# Patient Record
Sex: FEMALE | Race: WHITE | Employment: UNEMPLOYED | URBAN - NONMETROPOLITAN AREA
[De-identification: names, ages, dates, MRNs, and addresses within clinical notes are randomized per-mention and may not be internally consistent; named-entity substitution may affect disease eponyms.]

---

## 2020-10-21 ENCOUNTER — INITIAL CONSULT (OUTPATIENT)
Dept: PULMONOLOGY | Age: 25
End: 2020-10-21
Payer: COMMERCIAL

## 2020-10-21 VITALS
HEART RATE: 114 BPM | WEIGHT: 293 LBS | HEIGHT: 68 IN | OXYGEN SATURATION: 98 % | SYSTOLIC BLOOD PRESSURE: 126 MMHG | BODY MASS INDEX: 44.41 KG/M2 | DIASTOLIC BLOOD PRESSURE: 76 MMHG

## 2020-10-21 PROCEDURE — 99204 OFFICE O/P NEW MOD 45 MIN: CPT | Performed by: NURSE PRACTITIONER

## 2020-10-21 PROCEDURE — 1036F TOBACCO NON-USER: CPT | Performed by: NURSE PRACTITIONER

## 2020-10-21 PROCEDURE — G8427 DOCREV CUR MEDS BY ELIG CLIN: HCPCS | Performed by: NURSE PRACTITIONER

## 2020-10-21 PROCEDURE — G8484 FLU IMMUNIZE NO ADMIN: HCPCS | Performed by: NURSE PRACTITIONER

## 2020-10-21 PROCEDURE — G8419 CALC BMI OUT NRM PARAM NOF/U: HCPCS | Performed by: NURSE PRACTITIONER

## 2020-10-21 RX ORDER — FOLIC ACID 1 MG/1
1 TABLET ORAL DAILY
COMMUNITY
End: 2022-02-18 | Stop reason: ALTCHOICE

## 2020-10-21 RX ORDER — FERROUS SULFATE 325(65) MG
325 TABLET ORAL
COMMUNITY

## 2020-10-21 RX ORDER — SODIUM OXYBATE 0.5 G/ML
3.75 SOLUTION ORAL 2 TIMES DAILY
Qty: 450 ML | Refills: 0 | Status: SHIPPED | OUTPATIENT
Start: 2020-10-21 | End: 2021-09-28 | Stop reason: SDUPTHER

## 2020-10-21 RX ORDER — FLUOXETINE 10 MG/1
10 CAPSULE ORAL DAILY
COMMUNITY
End: 2020-10-21 | Stop reason: SDUPTHER

## 2020-10-21 RX ORDER — SODIUM OXYBATE 0.5 G/ML
3.75 SOLUTION ORAL 2 TIMES DAILY
Qty: 450 ML | Refills: 0 | Status: SHIPPED | OUTPATIENT
Start: 2020-10-21 | End: 2020-10-21 | Stop reason: DRUGHIGH

## 2020-10-21 RX ORDER — DEXTROAMPHETAMINE SACCHARATE, AMPHETAMINE ASPARTATE MONOHYDRATE, DEXTROAMPHETAMINE SULFATE AND AMPHETAMINE SULFATE 2.5; 2.5; 2.5; 2.5 MG/1; MG/1; MG/1; MG/1
10 CAPSULE, EXTENDED RELEASE ORAL EVERY MORNING
COMMUNITY
End: 2020-10-21

## 2020-10-21 RX ORDER — DEXTROAMPHETAMINE SACCHARATE, AMPHETAMINE ASPARTATE MONOHYDRATE, DEXTROAMPHETAMINE SULFATE AND AMPHETAMINE SULFATE 2.5; 2.5; 2.5; 2.5 MG/1; MG/1; MG/1; MG/1
10 CAPSULE, EXTENDED RELEASE ORAL EVERY MORNING
COMMUNITY
End: 2020-10-21 | Stop reason: SDUPTHER

## 2020-10-21 RX ORDER — DEXTROAMPHETAMINE SACCHARATE, AMPHETAMINE ASPARTATE, DEXTROAMPHETAMINE SULFATE AND AMPHETAMINE SULFATE 2.5; 2.5; 2.5; 2.5 MG/1; MG/1; MG/1; MG/1
10 TABLET ORAL
Qty: 30 TABLET | Refills: 0 | Status: SHIPPED | OUTPATIENT
Start: 2020-10-21 | End: 2020-11-30 | Stop reason: SDUPTHER

## 2020-10-21 RX ORDER — DEXTROAMPHETAMINE SACCHARATE, AMPHETAMINE ASPARTATE MONOHYDRATE, DEXTROAMPHETAMINE SULFATE AND AMPHETAMINE SULFATE 2.5; 2.5; 2.5; 2.5 MG/1; MG/1; MG/1; MG/1
10 CAPSULE, EXTENDED RELEASE ORAL EVERY MORNING
Qty: 30 CAPSULE | Refills: 0 | Status: SHIPPED | OUTPATIENT
Start: 2020-10-21 | End: 2020-11-30 | Stop reason: SDUPTHER

## 2020-10-21 RX ORDER — FLUOXETINE 10 MG/1
10 CAPSULE ORAL DAILY
Qty: 90 CAPSULE | Refills: 3 | Status: SHIPPED | OUTPATIENT
Start: 2020-10-21 | End: 2021-02-01 | Stop reason: SDUPTHER

## 2020-10-21 ASSESSMENT — ENCOUNTER SYMPTOMS
EYES NEGATIVE: 1
CHEST TIGHTNESS: 0
ABDOMINAL PAIN: 0
COUGH: 0
WHEEZING: 0
VOMITING: 0
ALLERGIC/IMMUNOLOGIC NEGATIVE: 1
SHORTNESS OF BREATH: 0
NAUSEA: 0
DIARRHEA: 0

## 2020-10-21 NOTE — PROGRESS NOTES
[facility] [place] [facility]    Patient: Francisca Weathers, 30 y.o.   : 1995  10/21/2020      New sleep patient      Subjective     Chief Complaint   Patient presents with    New Patient     Sleep consult, self referred. Last PSG 19 no machine        HPI  Adelfo Gibbons is here as self referral for FREDY/ narcolepsy, wants to switch providers due to location. Currently follows in [facility] with Dr Gina Cummings MD however patient lives in [facility] [place] and wants to f/u closer to home. Accompanied by her mother today   Symptoms include tossing and turning, excessive daytime sleepiness, awakening in the middle of the night because of unclear reason, does have trouble falling asleep once awake. Adelfo Gibbons has seen significant improvement in her cataplexy symptoms with current therapy. Started with Adderall in , the next year she was at [facility], was placed on Xzyrem at the time, and Prozac and Adderall were decreased. She has been on this therapy now for years and feels symptoms controlled. Here original sleep study was done at [facility] in - pr notes sleep efficacy was 78% and no evidence of PLMD or FREDY. MSLT 2010 demonstrated severe degress of daytime sleepiness , mean sleep latency 0.4 min, sleep onset REM periods: 4. She was then diagnosed with narcolepsy type I. During college pt had significant weight gain and per patient \" something with the opening in my throat the doctor was concernred aboout, so underwent sleep study \"   HST done in 2012 now revealed FREDY, AHI 7.9. She had a dental appliance custom made, but could never tolerate wearing. She was waking up at night spitting it out and developed dental carries. She has not been wearing the device now for about 3 months  Does have daytime sleepiness with ep worth of 14. Sleep Hx   SLEEP HISTORY    Sleep Symptoms  This has been evaluated or treated before.       Bedtime Narative  She goes to bed at 1030 pm  and wakes up at 4 am but goes back to sleep on cough until 730 am  Porsha Huang reports that this is  different on the weekends. Most of the time, ittakes her less than 30 min to fall asleep with difficulty falling back to sleep if she awakens, usually because she has to go to the bathroom. Nap History  Porsha Huang does take naps very often. If she does, they are helpful. Snoring History  Amandadoes not snore or has not been told she snores. There have not been witnessed apneas. She does not awakenwith choking or gasping. Elida Brooks does not have recurring dreams, and specifically does not have episodes of feeling paralyzed while awake, or anything to suggest cataplexy or dreams she would describe as hallucinations. Driving History  Porsha Huang does not report sleepiness while driving. There have not been driving accidents or near-misses related to sleepiness, fatigue or inattention. Weight Issues  There has not been a change in her weight over the past year, slight weight gain in past 5 years. Caffeine Intake  Porsha Huang does not drink caffeine, usually about  0  per day. Employment History  Porsha Huang works as  and  ,  The work hours are generally [phone] and there has notbeen any recent changes in the shifts or hours. Family Sleep History  There are notfamily members with a history of sleep problems.       Download     PAP Download:   Original or initial  AHI: 8.57     Date of initial study: 6/4/19   Neck Size: 17.5 inch  Mallampati Mallampati 3  ESS:  14   SAQLI: 88    Past Medical hx     PMH:  Past Medical History:   Diagnosis Date    Cataplexy 2010    Low iron     Narcolepsy 2010    FREDY (obstructive sleep apnea)      SURGICAL HISTORY:  Past Surgical History:   Procedure Laterality Date    KIDNEY SURGERY  02/2000     SOCIAL HISTORY:  Social History     Tobacco Use    Smoking status: Never Smoker    Smokeless tobacco: Never Used   Substance Use Topics    Alcohol use: Not on file    Drug She is not in acute distress. Appearance: She is well-developed. She is morbidly obese. HENT:      Mouth/Throat:      Lips: Pink. Mouth: Mucous membranes are moist.      Pharynx: Oropharynx is clear. No oropharyngeal exudate or posterior oropharyngeal erythema. Tonsils: No tonsillar exudate or tonsillar abscesses. Comments: Enlarged bilateral tonsils   mallampatti III, large tongue     Eyes:      Conjunctiva/sclera: Conjunctivae normal.   Neck:      Musculoskeletal: Neck supple. Thyroid: No thyromegaly. Vascular: No JVD. Trachea: Trachea normal.   Cardiovascular:      Rate and Rhythm: Normal rate and regular rhythm. Heart sounds: No murmur. No friction rub. Pulmonary:      Effort: Pulmonary effort is normal. No accessory muscle usage or respiratory distress. Breath sounds: Normal breath sounds. No wheezing, rhonchi or rales. Chest:      Chest wall: No tenderness. Musculoskeletal:      Right lower leg: No edema. Left lower leg: No edema. Lymphadenopathy:      Cervical: No cervical adenopathy. Skin:     General: Skin is warm and dry. Capillary Refill: Capillary refill takes less than 2 seconds. Nails: There is no clubbing. Neurological:      Mental Status: She is alert. Psychiatric:         Mood and Affect: Mood normal.         Behavior: Behavior normal.         Thought Content: Thought content normal.         Judgment: Judgment normal.          Sleep Study             Assessment      Diagnosis Orders   1. Narcolepsy and cataplexy  amphetamine-dextroamphetamine (ADDERALL, 10MG,) 10 MG tablet    amphetamine-dextroamphetamine (ADDERALL XR) 10 MG extended release capsule    sodium oxybate (XYREM) 500 MG/ML SOLN solution   2. FREDY (obstructive sleep apnea)  DME Order for CPAP as OP   3.  Morbid obesity with BMI of 45.0-49.9, adult ([facility] [address]  DME Order for CPAP as OP     Untreated FREDY, failed therapy with dental appliance   Plan   -she has been on therapy for her cataplexy and narcolepsy for several years with benefit and not having any major side effects or adverse effects of the medications: Will continue Xyrem, Adderall and Prozac - all refilled  -for her uncontrolled FREDY, will start AutoCPAP - pt would like to use [facility] in [facility]  - she call my office for earlier appointment if needed for worseningof sleep symptoms. - she was instructed on weight loss  - Tyler Charlton  was educated about my impression and plan. Patient verbalizes understanding. We will see Tyler Charlton  back in 6-8 weeks with download in [facility] clinic     PDMP Monitoring:    Last PDMP Melissa Oliver as Reviewed [facility]):  Review User Review Instant Review Result   [facility] 10/21/2020  9:09 AM Reviewed PDMP [1]       Urine Drug Screenings (1 yr)     No resulted procedures found.         Medication Contract and Consent for Opioid Use Documents Filed      No documents found- pt completed in office today prior to leaving               Electronically signed by SHANTEL Guadalupe CNP on 10/21/2020 at 9:24 AM    10/21/2020

## 2020-11-30 RX ORDER — DEXTROAMPHETAMINE SACCHARATE, AMPHETAMINE ASPARTATE MONOHYDRATE, DEXTROAMPHETAMINE SULFATE AND AMPHETAMINE SULFATE 2.5; 2.5; 2.5; 2.5 MG/1; MG/1; MG/1; MG/1
10 CAPSULE, EXTENDED RELEASE ORAL EVERY MORNING
Qty: 30 CAPSULE | Refills: 0 | Status: SHIPPED | OUTPATIENT
Start: 2020-11-30 | End: 2020-12-18 | Stop reason: SDUPTHER

## 2020-11-30 RX ORDER — DEXTROAMPHETAMINE SACCHARATE, AMPHETAMINE ASPARTATE, DEXTROAMPHETAMINE SULFATE AND AMPHETAMINE SULFATE 2.5; 2.5; 2.5; 2.5 MG/1; MG/1; MG/1; MG/1
10 TABLET ORAL
Qty: 30 TABLET | Refills: 0 | Status: SHIPPED | OUTPATIENT
Start: 2020-11-30 | End: 2020-12-18 | Stop reason: SDUPTHER

## 2020-11-30 NOTE — TELEPHONE ENCOUNTER
Eddy Mckinney called requesting a refill on the following medications:  Requested Prescriptions     Pending Prescriptions Disp Refills    amphetamine-dextroamphetamine (ADDERALL XR) 10 MG extended release capsule 30 capsule 0     Sig: Take 1 capsule by mouth every morning for 30 days.  amphetamine-dextroamphetamine (ADDERALL, 10MG,) 10 MG tablet 30 tablet 0     Sig: Take 1 tablet by mouth Daily with lunch for 30 days.      [facility]      Date of last visit: 10/21/20  Date of next visit (if applicable): Visit date not found

## 2020-12-18 ENCOUNTER — OFFICE VISIT (OUTPATIENT)
Dept: PULMONOLOGY | Age: 25
End: 2020-12-18
Payer: COMMERCIAL

## 2020-12-18 VITALS
OXYGEN SATURATION: 99 % | TEMPERATURE: 97.4 F | HEART RATE: 104 BPM | WEIGHT: 293 LBS | SYSTOLIC BLOOD PRESSURE: 128 MMHG | HEIGHT: 68 IN | BODY MASS INDEX: 44.41 KG/M2 | DIASTOLIC BLOOD PRESSURE: 74 MMHG

## 2020-12-18 PROCEDURE — 99214 OFFICE O/P EST MOD 30 MIN: CPT | Performed by: NURSE PRACTITIONER

## 2020-12-18 PROCEDURE — 1036F TOBACCO NON-USER: CPT | Performed by: NURSE PRACTITIONER

## 2020-12-18 PROCEDURE — G8417 CALC BMI ABV UP PARAM F/U: HCPCS | Performed by: NURSE PRACTITIONER

## 2020-12-18 PROCEDURE — G8484 FLU IMMUNIZE NO ADMIN: HCPCS | Performed by: NURSE PRACTITIONER

## 2020-12-18 PROCEDURE — G8427 DOCREV CUR MEDS BY ELIG CLIN: HCPCS | Performed by: NURSE PRACTITIONER

## 2020-12-18 RX ORDER — DEXTROAMPHETAMINE SACCHARATE, AMPHETAMINE ASPARTATE, DEXTROAMPHETAMINE SULFATE AND AMPHETAMINE SULFATE 2.5; 2.5; 2.5; 2.5 MG/1; MG/1; MG/1; MG/1
10 TABLET ORAL
Qty: 30 TABLET | Refills: 0 | Status: SHIPPED | OUTPATIENT
Start: 2020-12-30 | End: 2021-02-01 | Stop reason: SDUPTHER

## 2020-12-18 RX ORDER — DEXTROAMPHETAMINE SACCHARATE, AMPHETAMINE ASPARTATE MONOHYDRATE, DEXTROAMPHETAMINE SULFATE AND AMPHETAMINE SULFATE 2.5; 2.5; 2.5; 2.5 MG/1; MG/1; MG/1; MG/1
10 CAPSULE, EXTENDED RELEASE ORAL EVERY MORNING
Qty: 30 CAPSULE | Refills: 0 | Status: SHIPPED | OUTPATIENT
Start: 2020-12-30 | End: 2021-02-01 | Stop reason: SDUPTHER

## 2020-12-18 NOTE — PROGRESS NOTES
[place] [facility] and [address] [place] [address]         [id number]  12/18/2020   Chief Complaint   Patient presents with    Follow-up     FREDY 4-6 wk f/u with download        Pt of Dr. Hector Malone    PAP Download:   Original or initial AHI: 8.57   Date of initial study: 6/4/2019  Weight of initial study: 141  [] Compliant  3%   [] Noncompliant 93%     PAP Type Auto Level  4/15   Avg Hrs/Day 2 hr 0 min  AHI: 4.6   Recorded compliance dates,  11/17/20-12/16/20   Machine/Mfg: resmed Interface: Nasal    Provider:  [][facility]  [][facility] [][employer]  [][facility]         [][facility] [x]Other:  [facility]    Neck Size: 17.5  Mallampati Mallampati 3  ESS: 11  SAQLI:71      Here is a scan of the most recent download:            Presentation:   VALENTINA Forbes presents for sleep medicine follow up for obstructive sleep apnea. Since the last visit, VALENTINA Forbes is young female with narcolepsy and cataplexy, under good control on Adderall and Xyrem, recent weight gain and hypersomnia. Underwent PSG showing mild FREDY with AHI 12.9. set up on auto CPAP. Is using compliantly, feels pretty good. No problems with pressure, just getting use to it. Denies any current cataplexy     Equipment issues: The pressure is acceptable, the mask is acceptable and she is using the humidity. Sleep issues:  Do you feel better? Yes  More rested? Yes   Better concentration? NA    Progress History:   Since last visit any new medical issues? No  New ER or hospitlal visits? No  Any new or changes in medicines? No  Any new sleep medicines?  No      Past Medical History:   Diagnosis Date    Cataplexy 2010    Low iron     Narcolepsy 2010    FREDY (obstructive sleep apnea)        Past Surgical History:   Procedure Laterality Date    KIDNEY SURGERY  02/2000       Social History     Tobacco Use    Smoking status: Never Smoker    Smokeless tobacco: Never Used   Substance Use Topics    Alcohol use: Not on file    Drug use: Not on file No Known Allergies    Current Outpatient Medications   Medication Sig Dispense Refill    [START ON 12/30/2020] amphetamine-dextroamphetamine (ADDERALL, 10MG,) 10 MG tablet Take 1 tablet by mouth Daily with lunch for 30 days. 30 tablet 0    [START ON 12/30/2020] amphetamine-dextroamphetamine (ADDERALL XR) 10 MG extended release capsule Take 1 capsule by mouth every morning for 30 days. 30 capsule 0    CPAP Machine MISC by Does not apply route Please change CPAP pressure to 8 cm H20 set pressure   Keep EPR same 1 each 0    folic acid (FOLVITE) 1 MG tablet Take 1 mg by mouth daily      ferrous sulfate (IRON 325) 325 (65 Fe) MG tablet Take 325 mg by mouth daily (with breakfast)      MULTIPLE VITAMIN PO Take by mouth      FLUoxetine (PROZAC) 10 MG capsule Take 1 capsule by mouth daily 90 capsule 3    sodium oxybate (XYREM) 500 MG/ML SOLN solution Take 7.5 mLs by mouth 2 times daily for 30 days. Take one dose at bedtime and one dose at 2 am 450 mL 0     No current facility-administered medications for this visit. No family history on file. Review of Systems   General/Constitutional: No recent loss of weight or appetite changes. No fever or chills. HENT: Negative. Eyes: Negative. Upper respiratory tract: No nasal stuffiness or post nasal drip. Lower respiratory tract/ lungs: No cough or sputum production. No hemoptysis. Cardiovascular: No palpitations or chest pain. Gastrointestinal: No nausea or vomiting. Neurological: No focal neurologiacal weakness. Extremities: No edema. Musculoskeletal: No complaints. Genitourinary: No complaints. Hematological: Negative. Psychiatric/Behavioral: Negative. Skin: No itching. Physical Exam:    BMI: Body mass index is 49.57 kg/m².     Wt Readings from Last 3 Encounters:   12/18/20 (!) 326 lb (147.9 kg)   10/21/20 (!) 327 lb 12.8 oz (148.7 kg)     Weight stable / unchanged Vitals: /74 (Site: Right Lower Arm, Position: Sitting, Cuff Size: Medium Adult)   Pulse 104   Temp 97.4 °F (36.3 °C)   Ht 5' 8\" (1.727 m)   Wt (!) 326 lb (147.9 kg)   SpO2 99% Comment: on room air  BMI 49.57 kg/m²         General Appearance -obese, in no acute distress. HEENT - Head is normocephalic, atraumatic. PERRL. Oral mucosa pink and moist, no oral thrush. Mallampati Score - III (soft palate, base of uvula visible). Neck - Supple, symmetrical, trachea midline and soft. Lungs - Clear to auscultation, no wheezes, rales or rhonchi, aeration good. Cardiovascular - Heart sounds are normal. Regular rhythm normal rate without murmur, gallop or rub. Abdomen - Soft, nontender, non-distended. Neurologic - Alert and oriented x 3. Skin - No bruising or bleeding. Extremities - No cyanosis, clubbing or edema. ASSESSMENT/DIAGNOSIS     Diagnosis Orders   1. FREDY (obstructive sleep apnea)  CPAP Machine MISC   2. Narcolepsy and cataplexy  amphetamine-dextroamphetamine (ADDERALL, 10MG,) 10 MG tablet    amphetamine-dextroamphetamine (ADDERALL XR) 10 MG extended release capsule    CPAP Machine MISC   3. Morbid obesity with BMI of 45.0-49.9, adult [facility])          Plan   Do you need any equipment today? No.    -based on download using average 7.7 cwp, people tend to do better on set pressure, will change to 8 cwp set pressure. - She was advised to keep good compliance with current recommended pressure to get optimal results and clinical improvement.  - Recommend 7-9 hours of sleep with PAP treatment. - She was advised to call [employer] regarding supplies if needed.   -She is to call my office for earlier appointment if needed for worsening of sleep symptoms.   - She was instructed on weight loss. -continue adderrall- refilled, and xyrem with compliance   - Ryan Mckeon was educated about my impression and plan and verbalizes understanding. We will see Theodore Sealss back in: 3 months with download. Electronically signed by SHANTEL Mack CNP on 12/18/2020 at 11:37 AM

## 2021-02-01 DIAGNOSIS — G47.411 NARCOLEPSY AND CATAPLEXY: ICD-10-CM

## 2021-02-01 RX ORDER — FLUOXETINE 10 MG/1
10 CAPSULE ORAL DAILY
Qty: 90 CAPSULE | Refills: 3 | Status: SHIPPED | OUTPATIENT
Start: 2021-02-01 | End: 2021-04-01 | Stop reason: SDUPTHER

## 2021-02-01 RX ORDER — DEXTROAMPHETAMINE SACCHARATE, AMPHETAMINE ASPARTATE, DEXTROAMPHETAMINE SULFATE AND AMPHETAMINE SULFATE 2.5; 2.5; 2.5; 2.5 MG/1; MG/1; MG/1; MG/1
10 TABLET ORAL
Qty: 30 TABLET | Refills: 0 | Status: SHIPPED | OUTPATIENT
Start: 2021-02-01 | End: 2021-03-02 | Stop reason: SDUPTHER

## 2021-02-01 RX ORDER — DEXTROAMPHETAMINE SACCHARATE, AMPHETAMINE ASPARTATE MONOHYDRATE, DEXTROAMPHETAMINE SULFATE AND AMPHETAMINE SULFATE 2.5; 2.5; 2.5; 2.5 MG/1; MG/1; MG/1; MG/1
10 CAPSULE, EXTENDED RELEASE ORAL EVERY MORNING
Qty: 30 CAPSULE | Refills: 0 | Status: SHIPPED | OUTPATIENT
Start: 2021-02-01 | End: 2021-03-02 | Stop reason: SDUPTHER

## 2021-03-02 DIAGNOSIS — G47.411 NARCOLEPSY AND CATAPLEXY: ICD-10-CM

## 2021-03-02 NOTE — TELEPHONE ENCOUNTER
From: Rolan Echeverria  To:  Office of SHANTEL Alonzo CNP  Sent: 3/2/2021 1:58 PM EST  Subject: Medication Renewal Request    Refills have been requested for the following medications:     amphetamine-dextroamphetamine (ADDERALL, 10MG,) 10 MG tablet [SHANTEL Hillman CNP]     amphetamine-dextroamphetamine (ADDERALL XR) 10 MG extended release capsule SHANTEL Alonzo CNP]    Preferred pharmacy: [facility]/PHARMACY #[id number]- [address] [phone]

## 2021-03-03 RX ORDER — DEXTROAMPHETAMINE SACCHARATE, AMPHETAMINE ASPARTATE, DEXTROAMPHETAMINE SULFATE AND AMPHETAMINE SULFATE 2.5; 2.5; 2.5; 2.5 MG/1; MG/1; MG/1; MG/1
10 TABLET ORAL
Qty: 30 TABLET | Refills: 0 | Status: SHIPPED | OUTPATIENT
Start: 2021-03-03 | End: 2021-04-01 | Stop reason: SDUPTHER

## 2021-03-03 RX ORDER — DEXTROAMPHETAMINE SACCHARATE, AMPHETAMINE ASPARTATE MONOHYDRATE, DEXTROAMPHETAMINE SULFATE AND AMPHETAMINE SULFATE 2.5; 2.5; 2.5; 2.5 MG/1; MG/1; MG/1; MG/1
10 CAPSULE, EXTENDED RELEASE ORAL EVERY MORNING
Qty: 30 CAPSULE | Refills: 0 | Status: SHIPPED | OUTPATIENT
Start: 2021-03-03 | End: 2021-04-01 | Stop reason: SDUPTHER

## 2021-03-05 ENCOUNTER — OFFICE VISIT (OUTPATIENT)
Dept: PULMONOLOGY | Age: 26
End: 2021-03-05
Payer: COMMERCIAL

## 2021-03-05 VITALS
SYSTOLIC BLOOD PRESSURE: 126 MMHG | BODY MASS INDEX: 44.41 KG/M2 | WEIGHT: 293 LBS | OXYGEN SATURATION: 97 % | HEART RATE: 94 BPM | TEMPERATURE: 97.6 F | DIASTOLIC BLOOD PRESSURE: 78 MMHG | HEIGHT: 68 IN

## 2021-03-05 DIAGNOSIS — G47.411 NARCOLEPSY AND CATAPLEXY: ICD-10-CM

## 2021-03-05 DIAGNOSIS — G47.33 OSA ON CPAP: Primary | ICD-10-CM

## 2021-03-05 DIAGNOSIS — Z99.89 OSA ON CPAP: Primary | ICD-10-CM

## 2021-03-05 DIAGNOSIS — E66.01 MORBID OBESITY WITH BMI OF 45.0-49.9, ADULT (HCC): ICD-10-CM

## 2021-03-05 PROCEDURE — G8417 CALC BMI ABV UP PARAM F/U: HCPCS | Performed by: NURSE PRACTITIONER

## 2021-03-05 PROCEDURE — G8427 DOCREV CUR MEDS BY ELIG CLIN: HCPCS | Performed by: NURSE PRACTITIONER

## 2021-03-05 PROCEDURE — 99213 OFFICE O/P EST LOW 20 MIN: CPT | Performed by: NURSE PRACTITIONER

## 2021-03-05 PROCEDURE — G8484 FLU IMMUNIZE NO ADMIN: HCPCS | Performed by: NURSE PRACTITIONER

## 2021-03-05 PROCEDURE — 1036F TOBACCO NON-USER: CPT | Performed by: NURSE PRACTITIONER

## 2021-03-05 NOTE — PROGRESS NOTES
No Known Allergies      Current Outpatient Medications   Medication Sig Dispense Refill    amphetamine-dextroamphetamine (ADDERALL, 10MG,) 10 MG tablet Take 1 tablet by mouth Daily with lunch for 30 days. 30 tablet 0    amphetamine-dextroamphetamine (ADDERALL XR) 10 MG extended release capsule Take 1 capsule by mouth every morning for 30 days. 30 capsule 0    FLUoxetine (PROZAC) 10 MG capsule Take 1 capsule by mouth daily 90 capsule 3    CPAP Machine MISC by Does not apply route Please change CPAP pressure to 8 cm H20 set pressure   Keep EPR same 1 each 0    folic acid (FOLVITE) 1 MG tablet Take 1 mg by mouth daily      ferrous sulfate (IRON 325) 325 (65 Fe) MG tablet Take 325 mg by mouth daily (with breakfast)      MULTIPLE VITAMIN PO Take by mouth      sodium oxybate (XYREM) 500 MG/ML SOLN solution Take 7.5 mLs by mouth 2 times daily for 30 days. Take one dose at bedtime and one dose at 2 am 450 mL 0     No current facility-administered medications for this visit. Review of Systems   General/Constitutional: No recent loss of weight or appetite changes. No fever or chills. HENT: Negative. Eyes: Negative. Upper respiratory tract: No nasal stuffiness or post nasal drip. Lower respiratory tract/ lungs: No cough or sputum production. No hemoptysis. Cardiovascular: No palpitations or chest pain. Gastrointestinal: No nausea or vomiting. Neurological: No focal neurologiacal weakness. Extremities: No increased edema. Musculoskeletal: No new complaints. Genitourinary: No complaints. Hematological: Negative. Psychiatric/Behavioral: Negative. Skin: No itching. Physical Exam:    BMI: Body mass index is 48.81 kg/m².     Wt Readings from Last 3 Encounters:   03/05/21 (!) 321 lb (145.6 kg)   12/18/20 (!) 326 lb (147.9 kg)   10/21/20 (!) 327 lb 12.8 oz (148.7 kg)     Weight: stable / unchanged Vitals: /78 (Site: Right Lower Arm, Position: Sitting, Cuff Size: Medium Adult)   Pulse 94   Temp 97.6 °F (36.4 °C) (Temporal)   Ht 5' 8\" (1.727 m)   Wt (!) 321 lb (145.6 kg)   SpO2 97% Comment: r/a  BMI 48.81 kg/m²         General Appearance - Moderately built, moderately nourished, in no acute distress. HEENT - Head is normocephalic, atraumatic. Neck - Supple, symmetrical, trachea midline and soft. Lungs - normal effort   Neurologic - Alert and oriented x 3. Skin - No bruising or bleeding. ASSESSMENT/DIAGNOSIS     Diagnosis Orders   1. FREDY on CPAP     2. Narcolepsy and cataplexy     3. Morbid obesity with BMI of 45.0-49.9, adult [facility])              Plan     Do you need any equipment today? No.     - She was advised to continue current positive airway pressure therapy with above described pressure. - She was advised to work on better compliance with current recommended pressure to get optimal results and clinical improvement.  - Educated on the health risks with untreated sleep apnea. - Recommend 7-9 hours of sleep with PAP treatment. - Re-educated on proper sleep hygiene. - She was advised to call DME company regarding supplies if needed. Encouraged to discuss soreness from mask with her DME provider   - She is to call my office for earlier appointment if needed for worsening of sleep symptoms.   - She was instructed on weight loss. -continue Adderral and Xyrem as prescribed   - [address] [place] [address] was educated about my impression and plan and verbalizes understanding. We will see Samira Aranda back in: 6 months with download. 20 minutes was spent on this visit with > 50% being face to face obtaining HPI, examining patient, reviewing test results/download, educating and coordinating a treatment plan.    Electronically signed by SHANTEL Carson CNP on 3/5/2021 at 12:42 PM

## 2021-04-01 DIAGNOSIS — G47.411 NARCOLEPSY AND CATAPLEXY: ICD-10-CM

## 2021-04-02 RX ORDER — DEXTROAMPHETAMINE SACCHARATE, AMPHETAMINE ASPARTATE MONOHYDRATE, DEXTROAMPHETAMINE SULFATE AND AMPHETAMINE SULFATE 2.5; 2.5; 2.5; 2.5 MG/1; MG/1; MG/1; MG/1
10 CAPSULE, EXTENDED RELEASE ORAL EVERY MORNING
Qty: 30 CAPSULE | Refills: 0 | Status: SHIPPED | OUTPATIENT
Start: 2021-04-02 | End: 2021-05-01 | Stop reason: SDUPTHER

## 2021-04-02 RX ORDER — DEXTROAMPHETAMINE SACCHARATE, AMPHETAMINE ASPARTATE, DEXTROAMPHETAMINE SULFATE AND AMPHETAMINE SULFATE 2.5; 2.5; 2.5; 2.5 MG/1; MG/1; MG/1; MG/1
10 TABLET ORAL
Qty: 30 TABLET | Refills: 0 | Status: SHIPPED | OUTPATIENT
Start: 2021-04-02 | End: 2021-05-01 | Stop reason: SDUPTHER

## 2021-04-13 RX ORDER — FLUOXETINE 10 MG/1
10 CAPSULE ORAL DAILY
Qty: 90 CAPSULE | Refills: 3 | Status: SHIPPED | OUTPATIENT
Start: 2021-04-13 | End: 2021-05-01 | Stop reason: SDUPTHER

## 2021-04-13 NOTE — TELEPHONE ENCOUNTER
From: Jay Perez  To:  Office of SHANTEL Garrido CNP  Sent: 4/1/2021 6:12 PM EDT  Subject: Medication Renewal Request    Refills have been requested for the following medications:     FLUoxetine (PROZAC) 10 MG capsule SHANTEL Garrido CNP]    Preferred pharmacy: [facility]/PHARMACY #[id number]- [address] [facility], [address] - F [phone]      Medication renewals requested in this message routed separately:     amphetamine-dextroamphetamine (ADDERALL, 10MG,) 10 MG tablet [SHANTEL Hillman CNP]     amphetamine-dextroamphetamine (ADDERALL XR) 10 MG extended release capsule SHANTEL Garrido CNP]

## 2021-05-01 DIAGNOSIS — G47.411 NARCOLEPSY AND CATAPLEXY: ICD-10-CM

## 2021-05-03 RX ORDER — DEXTROAMPHETAMINE SACCHARATE, AMPHETAMINE ASPARTATE, DEXTROAMPHETAMINE SULFATE AND AMPHETAMINE SULFATE 2.5; 2.5; 2.5; 2.5 MG/1; MG/1; MG/1; MG/1
10 TABLET ORAL
Qty: 30 TABLET | Refills: 0 | Status: SHIPPED | OUTPATIENT
Start: 2021-05-03 | End: 2021-06-10 | Stop reason: SDUPTHER

## 2021-05-03 RX ORDER — FLUOXETINE 10 MG/1
10 CAPSULE ORAL DAILY
Qty: 90 CAPSULE | Refills: 3 | Status: SHIPPED | OUTPATIENT
Start: 2021-05-03 | End: 2021-06-10 | Stop reason: SDUPTHER

## 2021-05-03 RX ORDER — DEXTROAMPHETAMINE SACCHARATE, AMPHETAMINE ASPARTATE MONOHYDRATE, DEXTROAMPHETAMINE SULFATE AND AMPHETAMINE SULFATE 2.5; 2.5; 2.5; 2.5 MG/1; MG/1; MG/1; MG/1
10 CAPSULE, EXTENDED RELEASE ORAL EVERY MORNING
Qty: 30 CAPSULE | Refills: 0 | Status: SHIPPED | OUTPATIENT
Start: 2021-05-03 | End: 2021-06-10 | Stop reason: SDUPTHER

## 2021-06-10 DIAGNOSIS — G47.411 NARCOLEPSY AND CATAPLEXY: ICD-10-CM

## 2021-06-14 RX ORDER — DEXTROAMPHETAMINE SACCHARATE, AMPHETAMINE ASPARTATE MONOHYDRATE, DEXTROAMPHETAMINE SULFATE AND AMPHETAMINE SULFATE 2.5; 2.5; 2.5; 2.5 MG/1; MG/1; MG/1; MG/1
10 CAPSULE, EXTENDED RELEASE ORAL EVERY MORNING
Qty: 30 CAPSULE | Refills: 0 | Status: SHIPPED | OUTPATIENT
Start: 2021-06-14 | End: 2021-07-05 | Stop reason: SDUPTHER

## 2021-06-14 RX ORDER — FLUOXETINE 10 MG/1
10 CAPSULE ORAL DAILY
Qty: 90 CAPSULE | Refills: 3 | Status: SHIPPED | OUTPATIENT
Start: 2021-06-14 | End: 2021-07-05 | Stop reason: SDUPTHER

## 2021-06-14 RX ORDER — DEXTROAMPHETAMINE SACCHARATE, AMPHETAMINE ASPARTATE, DEXTROAMPHETAMINE SULFATE AND AMPHETAMINE SULFATE 2.5; 2.5; 2.5; 2.5 MG/1; MG/1; MG/1; MG/1
10 TABLET ORAL
Qty: 30 TABLET | Refills: 0 | Status: SHIPPED | OUTPATIENT
Start: 2021-06-14 | End: 2021-07-05 | Stop reason: SDUPTHER

## 2021-07-05 DIAGNOSIS — G47.411 NARCOLEPSY AND CATAPLEXY: ICD-10-CM

## 2021-07-06 RX ORDER — FLUOXETINE 10 MG/1
10 CAPSULE ORAL DAILY
Qty: 90 CAPSULE | Refills: 3 | Status: SHIPPED | OUTPATIENT
Start: 2021-07-06 | End: 2021-09-05 | Stop reason: SDUPTHER

## 2021-07-06 RX ORDER — DEXTROAMPHETAMINE SACCHARATE, AMPHETAMINE ASPARTATE MONOHYDRATE, DEXTROAMPHETAMINE SULFATE AND AMPHETAMINE SULFATE 2.5; 2.5; 2.5; 2.5 MG/1; MG/1; MG/1; MG/1
10 CAPSULE, EXTENDED RELEASE ORAL EVERY MORNING
Qty: 30 CAPSULE | Refills: 0 | Status: SHIPPED | OUTPATIENT
Start: 2021-07-06 | End: 2021-09-05 | Stop reason: SDUPTHER

## 2021-07-06 RX ORDER — DEXTROAMPHETAMINE SACCHARATE, AMPHETAMINE ASPARTATE, DEXTROAMPHETAMINE SULFATE AND AMPHETAMINE SULFATE 2.5; 2.5; 2.5; 2.5 MG/1; MG/1; MG/1; MG/1
10 TABLET ORAL
Qty: 30 TABLET | Refills: 0 | Status: SHIPPED | OUTPATIENT
Start: 2021-07-06 | End: 2021-09-05 | Stop reason: SDUPTHER

## 2021-09-17 ENCOUNTER — OFFICE VISIT (OUTPATIENT)
Dept: PULMONOLOGY | Age: 26
End: 2021-09-17
Payer: COMMERCIAL

## 2021-09-17 VITALS
HEART RATE: 85 BPM | OXYGEN SATURATION: 95 % | SYSTOLIC BLOOD PRESSURE: 128 MMHG | DIASTOLIC BLOOD PRESSURE: 72 MMHG | BODY MASS INDEX: 44.41 KG/M2 | WEIGHT: 293 LBS | HEIGHT: 68 IN | TEMPERATURE: 97.8 F

## 2021-09-17 DIAGNOSIS — G47.411 NARCOLEPSY AND CATAPLEXY: Primary | ICD-10-CM

## 2021-09-17 DIAGNOSIS — G47.10 HYPERSOMNIA: ICD-10-CM

## 2021-09-17 DIAGNOSIS — G47.33 OSA ON CPAP: ICD-10-CM

## 2021-09-17 DIAGNOSIS — Z99.89 OSA ON CPAP: ICD-10-CM

## 2021-09-17 DIAGNOSIS — E66.01 MORBID OBESITY WITH BMI OF 45.0-49.9, ADULT (HCC): ICD-10-CM

## 2021-09-17 PROCEDURE — G8427 DOCREV CUR MEDS BY ELIG CLIN: HCPCS | Performed by: NURSE PRACTITIONER

## 2021-09-17 PROCEDURE — 99213 OFFICE O/P EST LOW 20 MIN: CPT | Performed by: NURSE PRACTITIONER

## 2021-09-17 PROCEDURE — G8417 CALC BMI ABV UP PARAM F/U: HCPCS | Performed by: NURSE PRACTITIONER

## 2021-09-17 PROCEDURE — 1036F TOBACCO NON-USER: CPT | Performed by: NURSE PRACTITIONER

## 2021-09-17 ASSESSMENT — ENCOUNTER SYMPTOMS
CHEST TIGHTNESS: 0
DIARRHEA: 0
WHEEZING: 0
EYES NEGATIVE: 1
SHORTNESS OF BREATH: 0
VOMITING: 0
ABDOMINAL PAIN: 0
NAUSEA: 0
COUGH: 0

## 2021-09-17 NOTE — PATIENT INSTRUCTIONS
Try white noise or box fan noises instead of falling asleep with TV on.    Work on better on sleep hygiene

## 2021-09-17 NOTE — PROGRESS NOTES
[place] [facility]      Calvin Strickland         [id number]  9/17/2021   Chief Complaint   Patient presents with    Follow-up     FREDY 6 month sleep follow up with Anam TURNER         Pt of Karlene PAIZ Download:   Original or initial AHI: 8.57     Date of initial study: 6/4/2019      Compliant  0%     Noncompliant 87 %     PAP Type airsense 10 Level  8 cmh2o    Avg Hrs/Day 1:33  AHI: 1.5   Recorded compliance dates ,8/17/21-9/15/2021  Machine/Mfg:   [x] ResMed    [] Respironics/Dreamstation   Interface:   [x] Nasal    [] Nasal pillows   [] FFM      Provider:      [] -AMADEO     []Marti     [] Janeth    [] Erasmo Wagner    [x] Neeta               [] [facility]      [] [employer]    [] [address]      [] Other    Neck Size: 17.5  Mallampati Mallampati 3  ESS:  12 ( previously 11)   SAQLI: 92  Here is a scan of the most recent download:            Presentation:   Kaden Pryor presents for sleep medicine follow up for obstructive sleep apnea, narcolepsy  Since the last visit, Kaden Pryor has been trying to put her CPAP on every night but struggling to keep it on. Feels anxious , suffocated at times, takes it off without realizing it and if wakes up not trying to put it back on. On longer nights of wearing it she has noticed feeling a little more refreshed and other nights sleeps worse   Underlying narcolepsy and cataplexy is , denies any current cataplexy symptoms   Has felt benefit with Adderall 10 mg XR  in morning and 10 mg at lunch , Xyrem at night   RLS under good control, on folic acid and iron   Juggling 3 different jobs     Equipment issues: The pressure is  acceptable, the mask is acceptable     Sleep issues:  Do you feel better? No  More rested? Sometimes   Better concentration? no    Progress History:   Since last visit any new medical issues? No  New ER or hospital visits? No  Any new or changes in medicines? No  Any new sleep medicines?  No    Review of Systems -   Review of Systems   Constitutional: Positive for fatigue. Negative for activity change, appetite change, chills, fever and unexpected weight change. HENT: Negative. Eyes: Negative. Respiratory: Negative for cough, chest tightness, shortness of breath and wheezing. Cardiovascular: Negative for chest pain, palpitations and leg swelling. Gastrointestinal: Negative for abdominal pain, diarrhea, nausea and vomiting. Genitourinary: Negative. Musculoskeletal: Negative. Skin: Negative. Neurological: Negative. Psychiatric/Behavioral: Positive for sleep disturbance. Physical Exam:    BMI:  Body mass index is 48.53 kg/m². Wt Readings from Last 3 Encounters:   09/17/21 (!) 319 lb 3.2 oz (144.8 kg)   03/05/21 (!) 321 lb (145.6 kg)   12/18/20 (!) 326 lb (147.9 kg)     Weight stable / unchanged  Vitals: /72 (Site: Left Upper Arm, Position: Sitting, Cuff Size: Large Adult)   Pulse 85   Temp 97.8 °F (36.6 °C)   Ht 5' 8\" (1.727 m)   Wt (!) 319 lb 3.2 oz (144.8 kg)   SpO2 95% Comment: on RA  BMI 48.53 kg/m²       Physical Exam  Constitutional:       Appearance: Normal appearance. She is obese. HENT:      Head: Normocephalic and atraumatic. Eyes:      Conjunctiva/sclera: Conjunctivae normal.   Pulmonary:      Effort: Pulmonary effort is normal. No tachypnea, bradypnea or respiratory distress. Neurological:      Mental Status: She is alert and oriented to person, place, and time. Psychiatric:         Attention and Perception: Attention normal.         Mood and Affect: Mood normal.         Speech: Speech normal.         Behavior: Behavior normal.         Thought Content: Thought content normal.         Cognition and Memory: Cognition normal.         Judgment: Judgment normal.           ASSESSMENT/DIAGNOSIS     Diagnosis Orders   1. Narcolepsy and cataplexy     2. FREDY on CPAP     3. Morbid obesity with BMI of 45.0-49.9, adult ([facility] [address]     4.  Hypersomnia            Plan   Do you need any equipment today? No    - Download reviewed and discussed with patient  -discussed other options for her symptomatic FREDY : OAT vs. ENT referral for Surgical evaluation vs. Continued to keep trying CPAP ,and with all or any of these interventions weight loss   -she has tried and failed with OAT in past, wants to keep trying the CPAP as she has felt benefit with use on longer use nights   - She  was advised to continue current positive airway pressure therapy with above described pressure. - She  advised to keep good compliance with current recommended pressure to get optimal results and clinical improvement  - Recommend 7-9 hours of sleep with PAP  - She was advised to call [employer] regarding supplies if needed.   -She call my office for earlier appointment if needed for worsening of sleep symptoms.   - She was instructed on weight loss  -continue Adderral and Xyrem at current dosing  -Try white noise or box fan noises instead of falling asleep with TV on.   -Work on better on sleep hygiene   -continue Folic acid/ Iron supplements   - [facility] was educated about my impression and plan. Patient verbalizesunderstanding.     We will see Nicolette Richardson back in: 3 months with download    Information added by my medical assistant/LPN was reviewed today  Electronically signed by SHANTEL Santos CNP on 9/17/2021 at 12:40 PM

## 2021-09-28 DIAGNOSIS — G47.411 NARCOLEPSY AND CATAPLEXY: ICD-10-CM

## 2021-09-28 RX ORDER — SODIUM OXYBATE 0.5 G/ML
3.75 SOLUTION ORAL 2 TIMES DAILY
Qty: 450 ML | Refills: 5 | Status: SHIPPED | OUTPATIENT
Start: 2021-09-28 | End: 2022-04-02 | Stop reason: SDUPTHER

## 2021-10-04 DIAGNOSIS — G47.411 NARCOLEPSY AND CATAPLEXY: ICD-10-CM

## 2021-10-04 RX ORDER — DEXTROAMPHETAMINE SACCHARATE, AMPHETAMINE ASPARTATE, DEXTROAMPHETAMINE SULFATE AND AMPHETAMINE SULFATE 2.5; 2.5; 2.5; 2.5 MG/1; MG/1; MG/1; MG/1
10 TABLET ORAL
Qty: 30 TABLET | Refills: 0 | Status: SHIPPED | OUTPATIENT
Start: 2021-10-04 | End: 2021-11-01 | Stop reason: SDUPTHER

## 2021-10-04 RX ORDER — FLUOXETINE 10 MG/1
10 CAPSULE ORAL DAILY
Qty: 90 CAPSULE | Refills: 0 | Status: SHIPPED | OUTPATIENT
Start: 2021-10-04 | End: 2021-11-01 | Stop reason: SDUPTHER

## 2021-10-04 RX ORDER — DEXTROAMPHETAMINE SACCHARATE, AMPHETAMINE ASPARTATE MONOHYDRATE, DEXTROAMPHETAMINE SULFATE AND AMPHETAMINE SULFATE 2.5; 2.5; 2.5; 2.5 MG/1; MG/1; MG/1; MG/1
10 CAPSULE, EXTENDED RELEASE ORAL EVERY MORNING
Qty: 30 CAPSULE | Refills: 0 | Status: SHIPPED | OUTPATIENT
Start: 2021-10-04 | End: 2021-11-01 | Stop reason: SDUPTHER

## 2021-11-01 DIAGNOSIS — G47.411 NARCOLEPSY AND CATAPLEXY: ICD-10-CM

## 2021-11-01 RX ORDER — DEXTROAMPHETAMINE SACCHARATE, AMPHETAMINE ASPARTATE MONOHYDRATE, DEXTROAMPHETAMINE SULFATE AND AMPHETAMINE SULFATE 2.5; 2.5; 2.5; 2.5 MG/1; MG/1; MG/1; MG/1
10 CAPSULE, EXTENDED RELEASE ORAL EVERY MORNING
Qty: 30 CAPSULE | Refills: 0 | Status: SHIPPED | OUTPATIENT
Start: 2021-11-01 | End: 2021-12-11 | Stop reason: SDUPTHER

## 2021-11-01 RX ORDER — DEXTROAMPHETAMINE SACCHARATE, AMPHETAMINE ASPARTATE, DEXTROAMPHETAMINE SULFATE AND AMPHETAMINE SULFATE 2.5; 2.5; 2.5; 2.5 MG/1; MG/1; MG/1; MG/1
10 TABLET ORAL
Qty: 30 TABLET | Refills: 0 | Status: SHIPPED | OUTPATIENT
Start: 2021-11-01 | End: 2021-12-11 | Stop reason: SDUPTHER

## 2021-11-01 RX ORDER — FLUOXETINE 10 MG/1
10 CAPSULE ORAL DAILY
Qty: 90 CAPSULE | Refills: 0 | Status: SHIPPED | OUTPATIENT
Start: 2021-11-01 | End: 2021-12-11 | Stop reason: SDUPTHER

## 2021-12-11 DIAGNOSIS — G47.411 NARCOLEPSY AND CATAPLEXY: ICD-10-CM

## 2021-12-13 RX ORDER — DEXTROAMPHETAMINE SACCHARATE, AMPHETAMINE ASPARTATE, DEXTROAMPHETAMINE SULFATE AND AMPHETAMINE SULFATE 2.5; 2.5; 2.5; 2.5 MG/1; MG/1; MG/1; MG/1
10 TABLET ORAL
Qty: 30 TABLET | Refills: 0 | Status: SHIPPED | OUTPATIENT
Start: 2021-12-13 | End: 2022-01-01 | Stop reason: SDUPTHER

## 2021-12-13 RX ORDER — DEXTROAMPHETAMINE SACCHARATE, AMPHETAMINE ASPARTATE MONOHYDRATE, DEXTROAMPHETAMINE SULFATE AND AMPHETAMINE SULFATE 2.5; 2.5; 2.5; 2.5 MG/1; MG/1; MG/1; MG/1
10 CAPSULE, EXTENDED RELEASE ORAL EVERY MORNING
Qty: 30 CAPSULE | Refills: 0 | Status: SHIPPED | OUTPATIENT
Start: 2021-12-13 | End: 2022-01-01 | Stop reason: SDUPTHER

## 2021-12-13 RX ORDER — FLUOXETINE 10 MG/1
10 CAPSULE ORAL DAILY
Qty: 90 CAPSULE | Refills: 0 | Status: SHIPPED | OUTPATIENT
Start: 2021-12-13 | End: 2022-01-01 | Stop reason: SDUPTHER

## 2022-01-01 DIAGNOSIS — G47.411 NARCOLEPSY AND CATAPLEXY: ICD-10-CM

## 2022-01-03 RX ORDER — DEXTROAMPHETAMINE SACCHARATE, AMPHETAMINE ASPARTATE MONOHYDRATE, DEXTROAMPHETAMINE SULFATE AND AMPHETAMINE SULFATE 2.5; 2.5; 2.5; 2.5 MG/1; MG/1; MG/1; MG/1
10 CAPSULE, EXTENDED RELEASE ORAL EVERY MORNING
Qty: 30 CAPSULE | Refills: 0 | Status: SHIPPED | OUTPATIENT
Start: 2022-01-03 | End: 2022-02-14 | Stop reason: SDUPTHER

## 2022-01-03 RX ORDER — FLUOXETINE 10 MG/1
10 CAPSULE ORAL DAILY
Qty: 90 CAPSULE | Refills: 0 | Status: SHIPPED | OUTPATIENT
Start: 2022-01-03 | End: 2022-02-14 | Stop reason: SDUPTHER

## 2022-01-03 RX ORDER — DEXTROAMPHETAMINE SACCHARATE, AMPHETAMINE ASPARTATE, DEXTROAMPHETAMINE SULFATE AND AMPHETAMINE SULFATE 2.5; 2.5; 2.5; 2.5 MG/1; MG/1; MG/1; MG/1
10 TABLET ORAL
Qty: 30 TABLET | Refills: 0 | Status: SHIPPED | OUTPATIENT
Start: 2022-01-03 | End: 2022-02-14 | Stop reason: SDUPTHER

## 2022-02-14 DIAGNOSIS — G47.411 NARCOLEPSY AND CATAPLEXY: ICD-10-CM

## 2022-02-15 RX ORDER — DEXTROAMPHETAMINE SACCHARATE, AMPHETAMINE ASPARTATE, DEXTROAMPHETAMINE SULFATE AND AMPHETAMINE SULFATE 2.5; 2.5; 2.5; 2.5 MG/1; MG/1; MG/1; MG/1
10 TABLET ORAL
Qty: 30 TABLET | Refills: 0 | Status: SHIPPED | OUTPATIENT
Start: 2022-02-15 | End: 2022-03-14 | Stop reason: SDUPTHER

## 2022-02-15 RX ORDER — FLUOXETINE 10 MG/1
10 CAPSULE ORAL DAILY
Qty: 90 CAPSULE | Refills: 0 | Status: SHIPPED | OUTPATIENT
Start: 2022-02-15 | End: 2022-03-14 | Stop reason: SDUPTHER

## 2022-02-15 RX ORDER — DEXTROAMPHETAMINE SACCHARATE, AMPHETAMINE ASPARTATE MONOHYDRATE, DEXTROAMPHETAMINE SULFATE AND AMPHETAMINE SULFATE 2.5; 2.5; 2.5; 2.5 MG/1; MG/1; MG/1; MG/1
10 CAPSULE, EXTENDED RELEASE ORAL EVERY MORNING
Qty: 30 CAPSULE | Refills: 0 | Status: SHIPPED | OUTPATIENT
Start: 2022-02-15 | End: 2022-03-14 | Stop reason: SDUPTHER

## 2022-02-18 ENCOUNTER — OFFICE VISIT (OUTPATIENT)
Dept: PULMONOLOGY | Age: 27
End: 2022-02-18
Payer: COMMERCIAL

## 2022-02-18 VITALS
DIASTOLIC BLOOD PRESSURE: 74 MMHG | WEIGHT: 293 LBS | HEART RATE: 109 BPM | BODY MASS INDEX: 44.41 KG/M2 | TEMPERATURE: 96.5 F | HEIGHT: 68 IN | OXYGEN SATURATION: 96 % | SYSTOLIC BLOOD PRESSURE: 128 MMHG

## 2022-02-18 DIAGNOSIS — G47.10 HYPERSOMNIA: ICD-10-CM

## 2022-02-18 DIAGNOSIS — G47.33 OSA ON CPAP: ICD-10-CM

## 2022-02-18 DIAGNOSIS — Z99.89 OSA ON CPAP: ICD-10-CM

## 2022-02-18 DIAGNOSIS — E66.01 MORBID OBESITY WITH BMI OF 45.0-49.9, ADULT (HCC): ICD-10-CM

## 2022-02-18 DIAGNOSIS — G47.411 NARCOLEPSY AND CATAPLEXY: Primary | ICD-10-CM

## 2022-02-18 PROCEDURE — 99214 OFFICE O/P EST MOD 30 MIN: CPT | Performed by: NURSE PRACTITIONER

## 2022-02-18 PROCEDURE — G8484 FLU IMMUNIZE NO ADMIN: HCPCS | Performed by: NURSE PRACTITIONER

## 2022-02-18 PROCEDURE — G8427 DOCREV CUR MEDS BY ELIG CLIN: HCPCS | Performed by: NURSE PRACTITIONER

## 2022-02-18 PROCEDURE — 1036F TOBACCO NON-USER: CPT | Performed by: NURSE PRACTITIONER

## 2022-02-18 PROCEDURE — G8417 CALC BMI ABV UP PARAM F/U: HCPCS | Performed by: NURSE PRACTITIONER

## 2022-02-18 ASSESSMENT — ENCOUNTER SYMPTOMS
WHEEZING: 0
SHORTNESS OF BREATH: 0
NAUSEA: 0
EYES NEGATIVE: 1
DIARRHEA: 0
ABDOMINAL PAIN: 0
COUGH: 0
VOMITING: 0

## 2022-02-18 NOTE — PROGRESS NOTES
[facility]      Marissa Salguero         [id number]  2/18/2022   Chief Complaint   Patient presents with    Follow-up     Narcolepsy and cataplexy   5 month sleep follow up         Pt of Karlene     PAP Download:   Original or initial AHI: 8.57     Date of initial study: 6/4/19      Neck Size: 17.5  Mallampati Mallampati 3  ESS:  10  SAQLI: 93    Here is a scan of the most recent download:  N/A - pt has not been wearing PAP     Presentation:   Marycruz Alfredo presents for sleep medicine follow up for obstructive sleep apnea, narcolepsy, cataplexy  Since the last visit, Marycruz Alfredo is not tolerating CPAP. No particular reason, just do not sleep well, less rested. Poor compliance on last visit. Has been unable to keep it on more than 2 hours. Tried oral appliance prior to CPAP, this was worse. Zackery Lynn almost every single weekend. Cataplexy is under good control on fluoxetine, some occ. Symptoms if really tired. ESS 10 ( borderline hypersomnia) , she feels her narcolepsy is under good control on Xyrem 3.75 g BID      Equipment issues: The pressure is  acceptable, the mask is acceptable     Progress History:   Since last visit any new medical issues? No  New ER or hospital visits? No  Any new or changes in medicines? No  Any new sleep medicines? No    Review of Systems -   Review of Systems   Constitutional: Positive for fatigue. Negative for activity change, appetite change, chills, fever and unexpected weight change. HENT: Negative. Eyes: Negative. Respiratory: Negative for cough, shortness of breath and wheezing. Cardiovascular: Negative for chest pain, palpitations and leg swelling. Gastrointestinal: Negative for abdominal pain, diarrhea, nausea and vomiting. Genitourinary: Negative. Musculoskeletal: Negative. Skin: Negative. Neurological: Negative. Hematological: Negative. Psychiatric/Behavioral: Negative.          Physical Exam:    BMI:  Body mass index is 48.99 kg/m². Wt Readings from Last 3 Encounters:   02/18/22 (!) 322 lb 3.2 oz (146.1 kg)   09/17/21 (!) 319 lb 3.2 oz (144.8 kg)   03/05/21 (!) 321 lb (145.6 kg)     Weight stable / unchanged  Vitals: /74 (Site: Left Upper Arm, Position: Sitting)   Pulse 109   Temp 96.5 °F (35.8 °C)   Ht 5' 8\" (1.727 m)   Wt (!) 322 lb 3.2 oz (146.1 kg)   SpO2 96% Comment: on ra  BMI 48.99 kg/m²       Physical Exam  Vitals and nursing note reviewed. Constitutional:       Appearance: Normal appearance. She is morbidly obese. HENT:      Head: Normocephalic and atraumatic. Mouth/Throat:      Pharynx: Oropharynx is clear. Eyes:      Conjunctiva/sclera: Conjunctivae normal.   Pulmonary:      Effort: Pulmonary effort is normal. No tachypnea, bradypnea or respiratory distress. Skin:     Findings: No erythema or rash. Neurological:      Mental Status: She is alert and oriented to person, place, and time. Psychiatric:         Attention and Perception: Attention normal.         Mood and Affect: Mood normal.         Speech: Speech normal.         Behavior: Behavior normal.         Thought Content: Thought content normal.         Cognition and Memory: Cognition normal.         Judgment: Judgment normal.         ASSESSMENT/DIAGNOSIS     Diagnosis Orders   1. Narcolepsy and cataplexy     2. FREDY on CPAP     3. Morbid obesity with BMI of 45.0-49.9, adult ([address]     4. Hypersomnia              Plan   Do you need any equipment today? No  Discussed options for mild sleep apnea, including weight reduction and positional therapy: her FREDY was dx on HST therefore I am concerned her FREDY is actually worse than presented on HST   -She was educated on risks of untreated sleep apnea including  increased risk for cardiovascular and cerebrovascular events, HTN,  and morbidity including death.  -She was advised to loose weight by controlling diet and doing exercise once cleared by her family physician.    Consider referral to bariatric clinic at next visit if unable to loose weight   -she is willing to continue to try to use her CPAP, plan is to wait to put the mask on until up for her second dose of Xyrem, she thinks maybe she will be tired enough to tolerate if waits till then to put it on .   -continue Xyrem , fluoxetine and Adderral at current dosing     F/u in 3 months   -total time on encounter: 30 min   Electronically signed by SHANTEL Ladd CNP on 2/18/2022 at 9:14 AM

## 2022-03-14 DIAGNOSIS — G47.411 NARCOLEPSY AND CATAPLEXY: ICD-10-CM

## 2022-03-15 RX ORDER — FLUOXETINE 10 MG/1
10 CAPSULE ORAL DAILY
Qty: 90 CAPSULE | Refills: 0 | Status: SHIPPED | OUTPATIENT
Start: 2022-03-15 | End: 2022-04-02 | Stop reason: SDUPTHER

## 2022-03-15 RX ORDER — DEXTROAMPHETAMINE SACCHARATE, AMPHETAMINE ASPARTATE MONOHYDRATE, DEXTROAMPHETAMINE SULFATE AND AMPHETAMINE SULFATE 2.5; 2.5; 2.5; 2.5 MG/1; MG/1; MG/1; MG/1
10 CAPSULE, EXTENDED RELEASE ORAL EVERY MORNING
Qty: 30 CAPSULE | Refills: 0 | Status: SHIPPED | OUTPATIENT
Start: 2022-03-15 | End: 2022-05-11 | Stop reason: SDUPTHER

## 2022-03-15 RX ORDER — DEXTROAMPHETAMINE SACCHARATE, AMPHETAMINE ASPARTATE, DEXTROAMPHETAMINE SULFATE AND AMPHETAMINE SULFATE 2.5; 2.5; 2.5; 2.5 MG/1; MG/1; MG/1; MG/1
10 TABLET ORAL
Qty: 30 TABLET | Refills: 0 | Status: SHIPPED | OUTPATIENT
Start: 2022-03-15 | End: 2022-04-02 | Stop reason: SDUPTHER

## 2022-04-02 DIAGNOSIS — G47.411 NARCOLEPSY AND CATAPLEXY: ICD-10-CM

## 2022-04-04 RX ORDER — FLUOXETINE 10 MG/1
10 CAPSULE ORAL DAILY
Qty: 90 CAPSULE | Refills: 0 | Status: SHIPPED | OUTPATIENT
Start: 2022-04-04

## 2022-04-04 RX ORDER — SODIUM OXYBATE 0.5 G/ML
3.75 SOLUTION ORAL 2 TIMES DAILY
Qty: 450 ML | Refills: 5 | Status: SHIPPED | OUTPATIENT
Start: 2022-04-04 | End: 2022-08-11 | Stop reason: SDUPTHER

## 2022-04-04 RX ORDER — DEXTROAMPHETAMINE SACCHARATE, AMPHETAMINE ASPARTATE, DEXTROAMPHETAMINE SULFATE AND AMPHETAMINE SULFATE 2.5; 2.5; 2.5; 2.5 MG/1; MG/1; MG/1; MG/1
10 TABLET ORAL
Qty: 30 TABLET | Refills: 0 | Status: SHIPPED | OUTPATIENT
Start: 2022-04-15 | End: 2022-05-11 | Stop reason: SDUPTHER

## 2022-05-11 DIAGNOSIS — G47.411 NARCOLEPSY AND CATAPLEXY: ICD-10-CM

## 2022-05-11 RX ORDER — DEXTROAMPHETAMINE SACCHARATE, AMPHETAMINE ASPARTATE, DEXTROAMPHETAMINE SULFATE AND AMPHETAMINE SULFATE 2.5; 2.5; 2.5; 2.5 MG/1; MG/1; MG/1; MG/1
10 TABLET ORAL
Qty: 30 TABLET | Refills: 0 | Status: SHIPPED | OUTPATIENT
Start: 2022-05-11 | End: 2022-06-11 | Stop reason: SDUPTHER

## 2022-05-11 RX ORDER — DEXTROAMPHETAMINE SACCHARATE, AMPHETAMINE ASPARTATE MONOHYDRATE, DEXTROAMPHETAMINE SULFATE AND AMPHETAMINE SULFATE 2.5; 2.5; 2.5; 2.5 MG/1; MG/1; MG/1; MG/1
10 CAPSULE, EXTENDED RELEASE ORAL EVERY MORNING
Qty: 30 CAPSULE | Refills: 0 | Status: SHIPPED | OUTPATIENT
Start: 2022-05-11 | End: 2022-06-11 | Stop reason: SDUPTHER

## 2022-06-11 DIAGNOSIS — G47.411 NARCOLEPSY AND CATAPLEXY: ICD-10-CM

## 2022-06-14 RX ORDER — DEXTROAMPHETAMINE SACCHARATE, AMPHETAMINE ASPARTATE, DEXTROAMPHETAMINE SULFATE AND AMPHETAMINE SULFATE 2.5; 2.5; 2.5; 2.5 MG/1; MG/1; MG/1; MG/1
10 TABLET ORAL
Qty: 30 TABLET | Refills: 0 | Status: SHIPPED | OUTPATIENT
Start: 2022-06-14 | End: 2022-07-08 | Stop reason: SDUPTHER

## 2022-06-14 RX ORDER — DEXTROAMPHETAMINE SACCHARATE, AMPHETAMINE ASPARTATE MONOHYDRATE, DEXTROAMPHETAMINE SULFATE AND AMPHETAMINE SULFATE 2.5; 2.5; 2.5; 2.5 MG/1; MG/1; MG/1; MG/1
10 CAPSULE, EXTENDED RELEASE ORAL EVERY MORNING
Qty: 30 CAPSULE | Refills: 0 | Status: SHIPPED | OUTPATIENT
Start: 2022-06-14 | End: 2022-07-08 | Stop reason: SDUPTHER

## 2022-07-08 DIAGNOSIS — G47.411 NARCOLEPSY AND CATAPLEXY: ICD-10-CM

## 2022-07-08 RX ORDER — DEXTROAMPHETAMINE SACCHARATE, AMPHETAMINE ASPARTATE MONOHYDRATE, DEXTROAMPHETAMINE SULFATE AND AMPHETAMINE SULFATE 2.5; 2.5; 2.5; 2.5 MG/1; MG/1; MG/1; MG/1
10 CAPSULE, EXTENDED RELEASE ORAL EVERY MORNING
Qty: 30 CAPSULE | Refills: 0 | Status: SHIPPED | OUTPATIENT
Start: 2022-07-08 | End: 2022-08-31 | Stop reason: SDUPTHER

## 2022-07-08 RX ORDER — DEXTROAMPHETAMINE SACCHARATE, AMPHETAMINE ASPARTATE, DEXTROAMPHETAMINE SULFATE AND AMPHETAMINE SULFATE 2.5; 2.5; 2.5; 2.5 MG/1; MG/1; MG/1; MG/1
10 TABLET ORAL
Qty: 30 TABLET | Refills: 0 | Status: SHIPPED | OUTPATIENT
Start: 2022-07-08 | End: 2022-08-31 | Stop reason: SDUPTHER

## 2022-08-03 ENCOUNTER — TELEPHONE (OUTPATIENT)
Dept: PULMONOLOGY | Age: 27
End: 2022-08-03

## 2022-08-03 NOTE — TELEPHONE ENCOUNTER
Ivy Leach with [facility]  states she has made several attempts to contact the office concerning the patients Xyrem 500 MG/ML She says the  Pulmonary's message was to call the office back later as they are seeing other patient's with no option for leaving the office a message. The patient has had a loss of this medication and in order for [facility] to refill it needs approved by Terese Bowers.  Please advise

## 2022-08-05 NOTE — TELEPHONE ENCOUNTER
I looked patient up in codes[facility] and it looks like there is no data usage since 02/2022. Please advise.

## 2022-08-08 ENCOUNTER — TELEPHONE (OUTPATIENT)
Dept: PULMONOLOGY | Age: 27
End: 2022-08-08

## 2022-08-08 NOTE — TELEPHONE ENCOUNTER
Salvador Castro called with [facility] pharmacy and Pt is reporting an unexplained loss of 8 days of medication and they are asking for approval to send that to her. Please advise.

## 2022-08-11 ENCOUNTER — TELEPHONE (OUTPATIENT)
Dept: PULMONOLOGY | Age: 27
End: 2022-08-11

## 2022-08-11 DIAGNOSIS — G47.411 NARCOLEPSY AND CATAPLEXY: ICD-10-CM

## 2022-08-11 RX ORDER — SODIUM OXYBATE 0.5 G/ML
3.75 SOLUTION ORAL 2 TIMES DAILY
Qty: 450 ML | Refills: 5 | Status: SHIPPED | OUTPATIENT
Start: 2022-08-11 | End: 2023-02-07

## 2022-08-11 NOTE — PROGRESS NOTES
I will go ahead and refill this time ,I need to see her so we can discuss this and her sleep apnea.    Preferably sooner than next month

## 2022-08-12 NOTE — TELEPHONE ENCOUNTER
Pt called me and said she was told yesterday that we would fill this. I did not see notes so asked Karlene and she had informed Claudia yesterday. Claudia faxed the Xyrem order with the special instructions to fill early.   I called and talked to Pharmacist Tiffanie and informed about approval.

## 2022-08-12 NOTE — TELEPHONE ENCOUNTER
Hunter campbell called back from [address] about the early fill. I read her the previous notes in reference to this. She will inform the Pt also.

## 2022-08-15 NOTE — TELEPHONE ENCOUNTER
Phoned patient, left message informing patient that we will need to schedule her a sooner appointment in order to discuss non-compliance with PAP and refilling Xywav.

## 2022-08-16 NOTE — TELEPHONE ENCOUNTER
Spoke with patient, she states Johnella Jeromeuet has been refilled for this month already and she will come in for her scheduled follow up to discuss non compliance.

## 2022-08-31 DIAGNOSIS — G47.411 NARCOLEPSY AND CATAPLEXY: ICD-10-CM

## 2022-09-01 RX ORDER — DEXTROAMPHETAMINE SACCHARATE, AMPHETAMINE ASPARTATE, DEXTROAMPHETAMINE SULFATE AND AMPHETAMINE SULFATE 2.5; 2.5; 2.5; 2.5 MG/1; MG/1; MG/1; MG/1
10 TABLET ORAL
Qty: 30 TABLET | Refills: 0 | Status: SHIPPED | OUTPATIENT
Start: 2022-09-01 | End: 2022-10-04 | Stop reason: SDUPTHER

## 2022-09-01 RX ORDER — DEXTROAMPHETAMINE SACCHARATE, AMPHETAMINE ASPARTATE MONOHYDRATE, DEXTROAMPHETAMINE SULFATE AND AMPHETAMINE SULFATE 2.5; 2.5; 2.5; 2.5 MG/1; MG/1; MG/1; MG/1
10 CAPSULE, EXTENDED RELEASE ORAL EVERY MORNING
Qty: 30 CAPSULE | Refills: 0 | Status: SHIPPED | OUTPATIENT
Start: 2022-09-01 | End: 2022-10-04 | Stop reason: SDUPTHER

## 2022-09-01 NOTE — TELEPHONE ENCOUNTER
Received refill request for Adderall 10mg and Adderall extended release . Medication was last ordered by Karlene. Medication was last ordered on 07/08/2022 with 0 refills. Patient was last seen in the office 02/18/2022. Patient has a scheduled follow up 09/02/2022. Medication needs to be sent to [facility]armacy. Detail Level: Zone Detail Level: Generalized

## 2022-09-02 ENCOUNTER — OFFICE VISIT (OUTPATIENT)
Dept: PULMONOLOGY | Age: 27
End: 2022-09-02
Payer: COMMERCIAL

## 2022-09-02 VITALS
WEIGHT: 293 LBS | DIASTOLIC BLOOD PRESSURE: 72 MMHG | HEIGHT: 68 IN | TEMPERATURE: 97.5 F | SYSTOLIC BLOOD PRESSURE: 124 MMHG | HEART RATE: 81 BPM | OXYGEN SATURATION: 97 % | BODY MASS INDEX: 44.41 KG/M2

## 2022-09-02 DIAGNOSIS — E66.01 MORBID OBESITY WITH BMI OF 45.0-49.9, ADULT (HCC): ICD-10-CM

## 2022-09-02 DIAGNOSIS — Z78.9 INTOLERANCE OF CONTINUOUS POSITIVE AIRWAY PRESSURE (CPAP) VENTILATION: ICD-10-CM

## 2022-09-02 DIAGNOSIS — G47.33 OSA (OBSTRUCTIVE SLEEP APNEA): ICD-10-CM

## 2022-09-02 DIAGNOSIS — G47.411 NARCOLEPSY AND CATAPLEXY: Primary | ICD-10-CM

## 2022-09-02 PROCEDURE — 99215 OFFICE O/P EST HI 40 MIN: CPT | Performed by: NURSE PRACTITIONER

## 2022-09-02 PROCEDURE — G8427 DOCREV CUR MEDS BY ELIG CLIN: HCPCS | Performed by: NURSE PRACTITIONER

## 2022-09-02 PROCEDURE — 1036F TOBACCO NON-USER: CPT | Performed by: NURSE PRACTITIONER

## 2022-09-02 PROCEDURE — G8417 CALC BMI ABV UP PARAM F/U: HCPCS | Performed by: NURSE PRACTITIONER

## 2022-09-02 ASSESSMENT — ENCOUNTER SYMPTOMS
DIARRHEA: 0
COUGH: 0
EYES NEGATIVE: 1
SHORTNESS OF BREATH: 0
NAUSEA: 0
WHEEZING: 0
VOMITING: 0
ABDOMINAL PAIN: 0

## 2022-09-02 NOTE — PROGRESS NOTES
[facility]      Gracia Benton         [id number]  9/2/2022   Chief Complaint   Patient presents with    Follow-up     FREDY Sleep follow up *patient not using machine        Pt of Karlene    PAP Download:   Original or initial AHI: 8.57     Date of initial study: 06/04/2019      Neck Size: 17.5  Mallampati Mallampati 3  ESS:  5  SAQLI: 95    Here is a scan of the most recent download:  Patient is not using machine . Does not feel it is giving her a good nights rest.   Mind is always racing when trying to wear her PAP. There is nothing in particular about the mask or pressure that is bothering her, its just everything. Tried to wait until second dose of Xyrem to put mask on, in hopes that she would be tired enough to go right back to sleep , this did not work. Was up most of the night. Gets restless sleep when she wears the PAP and more restful sleep with out it. Wants to take naps on nights she wore machine   Tried oral appliance first, this worse . Caused jaw pain . Was never adjusted by dentist.  She was taking current xyrem dose during sleep study . AHI 8.57    Has been taking Xyrem for past 12-13 years. Started in high school  FREDY was dx 3 years ago after significant wt gain at college   Taking Adderall 10 mg XR in Am and 10 mg at lunch - overall her sleepiness has been manageable. 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 Cataplexy is under good control on fluoxetine     Review of Systems -   Review of Systems   Constitutional:  Negative for activity change, appetite change, chills, fatigue, fever and unexpected weight change. HENT: Negative. Eyes: Negative. Respiratory:  Negative for cough, shortness of breath and wheezing. Cardiovascular:  Negative for chest pain, palpitations and leg swelling. Gastrointestinal:  Negative for abdominal pain, diarrhea, nausea and vomiting. Genitourinary: Negative. Musculoskeletal: Negative. Skin: Negative. Neurological: Negative. Hematological: Negative. Psychiatric/Behavioral:  Positive for sleep disturbance (with CPAP). Physical Exam:    BMI:  Body mass index is 50.27 kg/m². Wt Readings from Last 3 Encounters:   09/02/22 (!) 330 lb 9.6 oz (150 kg)   02/18/22 (!) 322 lb 3.2 oz (146.1 kg)   09/17/21 (!) 319 lb 3.2 oz (144.8 kg)     Weight stable / unchanged  Vitals: /72   Pulse 81   Temp 97.5 °F (36.4 °C)   Ht 5' 8\" (1.727 m)   Wt (!) 330 lb 9.6 oz (150 kg)   SpO2 97% Comment: PAtient on room air  BMI 50.27 kg/m²       Physical Exam  Vitals and nursing note reviewed. Constitutional:       Appearance: Normal appearance. She is morbidly obese. HENT:      Head: Normocephalic and atraumatic. Mouth/Throat:      Pharynx: Oropharynx is clear. Eyes:      Conjunctiva/sclera: Conjunctivae normal.   Pulmonary:      Effort: Pulmonary effort is normal. No tachypnea, bradypnea or respiratory distress. Skin:     Findings: No erythema or rash. Neurological:      Mental Status: She is alert and oriented to person, place, and time. Psychiatric:         Attention and Perception: Attention normal.         Mood and Affect: Mood normal.         Speech: Speech normal.         Behavior: Behavior normal.         Thought Content: Thought content normal.         Cognition and Memory: Cognition normal.         Judgment: Judgment normal.       ASSESSMENT/DIAGNOSIS     Diagnosis Orders   1. Narcolepsy and cataplexy        2. FREDY (obstructive sleep apnea)        3. Morbid obesity with BMI of 45.0-49.9, adult ([facility] [address]        4. Intolerance of continuous positive airway pressure (CPAP) ventilation                 Plan   Do you need any equipment today? No    -stop use of CPAP, has not been able to tolerate. AHI < 10 and this was while taking Xyrem. She does not wish to stop Xyrem  in fear of narcolepsy becoming uncontrolled.    I agree, her FREDY is mild enough that risk of stopping medication outweighs the benefit. She is agreeable to continue to work on other therapies for her Sleep apnea. She continues to work on weight loss and positional therapy   Is interested in trying Excite device that has been FDA approved for mild FREDY, discussed how this is used, will need to use daily for 6 weeks, once reach effective treatment after 6 weeks then we go to maintenance dose 2-3 times per week. If repeat sleep testing is done , would wait full 6 weeks of compliant therapy before HST     Did discuss going back on oral appliance. She may just need this adjusted. If wanting to re- try she should schedule appt with dentist that dispensed it to have it adjusted to less jaw advancement. Continue Xyrem and Adderrall at current dosing along with fluoxetine.   Symptoms have been controlled    Would like to start her on Wakix , hopefully we can then wean back or off Adderall - she is agreeable     F/u in 3 months   -billing based on time, total time on encounter today 50 min -  Electronically signed by SHANTEL Barrett CNP on 9/2/2022 at 1:13 PM

## 2022-09-07 ENCOUNTER — TELEPHONE (OUTPATIENT)
Dept: PULMONOLOGY | Age: 27
End: 2022-09-07

## 2022-10-04 DIAGNOSIS — G47.411 NARCOLEPSY AND CATAPLEXY: ICD-10-CM

## 2022-10-05 RX ORDER — DEXTROAMPHETAMINE SACCHARATE, AMPHETAMINE ASPARTATE, DEXTROAMPHETAMINE SULFATE AND AMPHETAMINE SULFATE 2.5; 2.5; 2.5; 2.5 MG/1; MG/1; MG/1; MG/1
10 TABLET ORAL
Qty: 30 TABLET | Refills: 0 | Status: SHIPPED | OUTPATIENT
Start: 2022-10-05 | End: 2022-11-04

## 2022-10-05 RX ORDER — DEXTROAMPHETAMINE SACCHARATE, AMPHETAMINE ASPARTATE MONOHYDRATE, DEXTROAMPHETAMINE SULFATE AND AMPHETAMINE SULFATE 2.5; 2.5; 2.5; 2.5 MG/1; MG/1; MG/1; MG/1
10 CAPSULE, EXTENDED RELEASE ORAL EVERY MORNING
Qty: 30 CAPSULE | Refills: 0 | Status: SHIPPED | OUTPATIENT
Start: 2022-10-05 | End: 2022-11-04

## 2022-10-10 RX ORDER — PITOLISANT HYDROCHLORIDE 17.8 MG/1
TABLET, FILM COATED ORAL
Qty: 23 TABLET | Refills: 2 | Status: SHIPPED | OUTPATIENT
Start: 2022-10-10 | End: 2022-10-19 | Stop reason: SDUPTHER

## 2022-10-17 NOTE — TELEPHONE ENCOUNTER
Patient called back regarding Xyrem and became upset when I told her that you would not refill it early. She states that you told her that since her FREDY was mild if she could not tolerate the CPAP it was ok. She states that she was vacation and lest some of the med there. I offered her an appt this afternoon and she cannot make it. She started crying stating that she cannot go a week without her medication.
Patient notified and I advised patient that she needs to be wearing her CPAP.
None

## 2022-10-19 ENCOUNTER — TELEPHONE (OUTPATIENT)
Dept: PULMONOLOGY | Age: 27
End: 2022-10-19

## 2022-10-19 RX ORDER — PITOLISANT HYDROCHLORIDE 17.8 MG/1
17.8 TABLET, FILM COATED ORAL DAILY
Qty: 30 TABLET | Refills: 2 | Status: SHIPPED | OUTPATIENT
Start: 2022-10-19

## 2022-10-19 NOTE — TELEPHONE ENCOUNTER
Pharmacy needing to clarify if you want them to dispense 30 days of Wakix, for the maintenance dose. Rx was sent with 23 days.  Please clarify

## 2022-10-31 DIAGNOSIS — G47.411 NARCOLEPSY AND CATAPLEXY: ICD-10-CM

## 2022-11-03 ENCOUNTER — TELEPHONE (OUTPATIENT)
Dept: PULMONOLOGY | Age: 27
End: 2022-11-03

## 2022-11-03 NOTE — TELEPHONE ENCOUNTER
Received approval from [address] [place] [address] for xyrem for 10-21-22 through 10-20-23. Approval faxed to [facility].

## 2022-11-07 RX ORDER — DEXTROAMPHETAMINE SACCHARATE, AMPHETAMINE ASPARTATE MONOHYDRATE, DEXTROAMPHETAMINE SULFATE AND AMPHETAMINE SULFATE 2.5; 2.5; 2.5; 2.5 MG/1; MG/1; MG/1; MG/1
10 CAPSULE, EXTENDED RELEASE ORAL EVERY MORNING
Qty: 30 CAPSULE | Refills: 0 | Status: SHIPPED | OUTPATIENT
Start: 2022-11-07 | End: 2022-12-07

## 2022-11-07 RX ORDER — DEXTROAMPHETAMINE SACCHARATE, AMPHETAMINE ASPARTATE, DEXTROAMPHETAMINE SULFATE AND AMPHETAMINE SULFATE 2.5; 2.5; 2.5; 2.5 MG/1; MG/1; MG/1; MG/1
10 TABLET ORAL
Qty: 30 TABLET | Refills: 0 | Status: SHIPPED | OUTPATIENT
Start: 2022-11-07 | End: 2022-12-07

## 2022-12-02 ENCOUNTER — OFFICE VISIT (OUTPATIENT)
Dept: PULMONOLOGY | Age: 27
End: 2022-12-02
Payer: COMMERCIAL

## 2022-12-02 VITALS
TEMPERATURE: 97.6 F | WEIGHT: 293 LBS | HEIGHT: 68 IN | DIASTOLIC BLOOD PRESSURE: 76 MMHG | SYSTOLIC BLOOD PRESSURE: 126 MMHG | BODY MASS INDEX: 44.41 KG/M2 | OXYGEN SATURATION: 97 % | HEART RATE: 87 BPM

## 2022-12-02 DIAGNOSIS — G47.33 OSA (OBSTRUCTIVE SLEEP APNEA): ICD-10-CM

## 2022-12-02 DIAGNOSIS — G47.411 NARCOLEPSY AND CATAPLEXY: Primary | ICD-10-CM

## 2022-12-02 DIAGNOSIS — E66.01 MORBID OBESITY WITH BMI OF 45.0-49.9, ADULT (HCC): ICD-10-CM

## 2022-12-02 DIAGNOSIS — Z78.9 INTOLERANCE OF CONTINUOUS POSITIVE AIRWAY PRESSURE (CPAP) VENTILATION: ICD-10-CM

## 2022-12-02 PROCEDURE — G8417 CALC BMI ABV UP PARAM F/U: HCPCS | Performed by: NURSE PRACTITIONER

## 2022-12-02 PROCEDURE — G8484 FLU IMMUNIZE NO ADMIN: HCPCS | Performed by: NURSE PRACTITIONER

## 2022-12-02 PROCEDURE — 99214 OFFICE O/P EST MOD 30 MIN: CPT | Performed by: NURSE PRACTITIONER

## 2022-12-02 PROCEDURE — G8427 DOCREV CUR MEDS BY ELIG CLIN: HCPCS | Performed by: NURSE PRACTITIONER

## 2022-12-02 PROCEDURE — 1036F TOBACCO NON-USER: CPT | Performed by: NURSE PRACTITIONER

## 2022-12-02 RX ORDER — DEXTROAMPHETAMINE SACCHARATE, AMPHETAMINE ASPARTATE MONOHYDRATE, DEXTROAMPHETAMINE SULFATE AND AMPHETAMINE SULFATE 2.5; 2.5; 2.5; 2.5 MG/1; MG/1; MG/1; MG/1
10 CAPSULE, EXTENDED RELEASE ORAL EVERY MORNING
Qty: 30 CAPSULE | Refills: 0 | Status: SHIPPED | OUTPATIENT
Start: 2022-12-02 | End: 2023-01-01

## 2022-12-02 RX ORDER — DEXTROAMPHETAMINE SACCHARATE, AMPHETAMINE ASPARTATE, DEXTROAMPHETAMINE SULFATE AND AMPHETAMINE SULFATE 2.5; 2.5; 2.5; 2.5 MG/1; MG/1; MG/1; MG/1
10 TABLET ORAL
Qty: 30 TABLET | Refills: 0 | Status: SHIPPED | OUTPATIENT
Start: 2022-12-02 | End: 2023-01-01

## 2022-12-02 RX ORDER — PITOLISANT HYDROCHLORIDE 17.8 MG/1
TABLET, FILM COATED ORAL
Qty: 30 TABLET | Refills: 2 | OUTPATIENT
Start: 2022-12-02

## 2022-12-02 ASSESSMENT — ENCOUNTER SYMPTOMS
COUGH: 0
DIARRHEA: 0
VOMITING: 0
WHEEZING: 0
EYES NEGATIVE: 1
NAUSEA: 0
ABDOMINAL PAIN: 0
SHORTNESS OF BREATH: 0

## 2022-12-02 NOTE — PROGRESS NOTES
[place]      Armando Guerrero         [id number]  12/2/2022   Chief Complaint   Patient presents with    Follow-up     3 month Narcolepsy follow up,   Adderall   Wakix   Xyrem   Prozac        Pt of Karlene PAIZ Download:   Original or initial AHI: 8.57     Date of initial study: 06/14/2019     Neck Size: 17.5  Mallampati Mallampati 3  ESS:  7  SAQLI: 95    Here is a scan of the most recent download:  Patient not using machine. Has been working on wt loss and positional therapy   Sleeping well. Wt stable from last visit. Not loosing. Presentation:   Jamal Riedel presents for 3 monthssle medicine follow up for obstructive sleep apnea, narcolepsy, cataplexy   Since the last visit, Jamal Riedel started on wakix 8.9 mg and went up to 17.8 mg daily. Has not noticed any difference in her daytime sleepiness. Still taking Adderall 10 mg ER in AM and PRN Adderall 10 mg at lunch   Continues to take Xyrem 3.75 mg twice dosing . Feels she is sleeping well. Not having any problems getting up for second dose. Taking Prozac 10 mg PO daily for cataplexy. Denies any current cataplexy symptoms. ESS 7 . Denies sleepiness with driving. Is able to function at work . Progress History:   Since last visit any new medical issues? No  Sleep Related Issues? No  New ER or hospital visits? No  Any new or changes in medicines? No  Any new sleep medicines? No    Review of Systems -   Review of Systems   Constitutional:  Positive for fatigue. Negative for activity change, appetite change, chills, fever and unexpected weight change. HENT: Negative. Eyes: Negative. Respiratory:  Negative for cough, shortness of breath and wheezing. Cardiovascular:  Negative for chest pain, palpitations and leg swelling. Gastrointestinal:  Negative for abdominal pain, diarrhea, nausea and vomiting. Genitourinary: Negative. Musculoskeletal: Negative. Skin: Negative. Neurological: Negative. Hematological: Negative. Psychiatric/Behavioral: Negative. Physical Exam:    BMI:  Body mass index is 50.33 kg/m². Wt Readings from Last 3 Encounters:   12/02/22 (!) 331 lb (150.1 kg)   09/02/22 (!) 330 lb 9.6 oz (150 kg)   02/18/22 (!) 322 lb 3.2 oz (146.1 kg)     Weight stable / unchanged  Vitals: /76   Pulse 87   Temp 97.6 °F (36.4 °C)   Ht 5' 8\" (1.727 m)   Wt (!) 331 lb (150.1 kg)   SpO2 97% Comment: r/a  BMI 50.33 kg/m²       Physical Exam  Vitals and nursing note reviewed. Constitutional:       Appearance: Normal appearance. She is obese. HENT:      Head: Normocephalic and atraumatic. Mouth/Throat:      Pharynx: Oropharynx is clear. Eyes:      Conjunctiva/sclera: Conjunctivae normal.   Pulmonary:      Effort: Pulmonary effort is normal. No tachypnea, bradypnea or respiratory distress. Skin:     Findings: No erythema or rash. Neurological:      Mental Status: She is alert and oriented to person, place, and time. Psychiatric:         Attention and Perception: Attention normal.         Mood and Affect: Mood normal.         Speech: Speech normal.         Behavior: Behavior normal.         Thought Content: Thought content normal.         Cognition and Memory: Cognition normal.         Judgment: Judgment normal.         ASSESSMENT/DIAGNOSIS     Diagnosis Orders   1. Narcolepsy and cataplexy  amphetamine-dextroamphetamine (ADDERALL XR) 10 MG extended release capsule    amphetamine-dextroamphetamine (ADDERALL, 10MG,) 10 MG tablet      2. FREDY (obstructive sleep apnea)        3. Intolerance of continuous positive airway pressure (CPAP) ventilation        4.  Morbid obesity with BMI of 45.0-49.9, adult ([facility])             Plan      -increase Wakix to 35.6 mg PO daily   -Continue Adderall 10 mg extended release tablet p.o. in a.m. followed by 10 mg immediate release p.o. as needed daily  -Continue working on positional therapy avoid supine positioning due to underlying mild obstructive sleep apnea. -Orders were placed for excite oral device last visit. Since last visit we have found out this is going to be an out-of-pocket cash pay and too expensive for the patient. We will no longer pursue the excite, she does still have a CPAP machine at home she is tried several times and intolerant   - She was instructed on need for  weight loss with increased activity and diet changes  -Continue Xyrem 3.75 g twice nightly dosing  -Continue to work on good sleep hygiene practices    - Jeremiah Mijares was educated about my impression and plan. Patient verbalizesunderstanding.   We will see Katelin Avendano back in: 2 months     Information added by my medical assistant/LPN was reviewed today    billing based on medical decision making     SHANTEL Roth-CNP   12/2/2022

## 2022-12-02 NOTE — TELEPHONE ENCOUNTER
Received refill request for [facility]. Medication was last ordered by Karlene. Medication was last ordered on 10/19/2022 with 2 refills. Patient was last seen in the office 09/02/2022. Patient has a scheduled follow up 12/02/2022. Medication needs to be sent to [facility] specialty Pharm[facility].

## 2022-12-08 ENCOUNTER — TELEPHONE (OUTPATIENT)
Dept: PULMONOLOGY | Age: 27
End: 2022-12-08

## 2022-12-08 NOTE — TELEPHONE ENCOUNTER
Sera Lawler with [facility] specialty Pharmacy is calling because the quantity for this  prescription needs correction  Sera Lawler says the quantity should be written for 60 days.  Please  fax to [phone]

## 2022-12-13 RX ORDER — PITOLISANT HYDROCHLORIDE 17.8 MG/1
TABLET, FILM COATED ORAL
Qty: 30 TABLET | Refills: 5 | OUTPATIENT
Start: 2022-12-13

## 2022-12-15 ENCOUNTER — TELEPHONE (OUTPATIENT)
Dept: PULMONOLOGY | Age: 27
End: 2022-12-15

## 2022-12-27 ENCOUNTER — TELEPHONE (OUTPATIENT)
Dept: PULMONOLOGY | Age: 27
End: 2022-12-27

## 2023-01-03 DIAGNOSIS — G47.411 NARCOLEPSY AND CATAPLEXY: ICD-10-CM

## 2023-01-04 NOTE — TELEPHONE ENCOUNTER
Received refill request for Adderall. Medication was last ordered by Karlene. Medication was last ordered on 12/2/22 with 0 refills. Received refill request for Adderall XR. Medication was last ordered by Karlene. Medication was last ordered on 12/2/22 with 0 refills. Patient was last seen in the office 12/2/22. Does patient have a scheduled follow up?: yes - 3/3/23    Medication needs to be sent to [facility]/pharmacy #[id number]- [address] [facility], [address] - F [phone]. Thank you, please advise!     Patient's Allergies:  No Known Allergies

## 2023-01-06 RX ORDER — DEXTROAMPHETAMINE SACCHARATE, AMPHETAMINE ASPARTATE, DEXTROAMPHETAMINE SULFATE AND AMPHETAMINE SULFATE 2.5; 2.5; 2.5; 2.5 MG/1; MG/1; MG/1; MG/1
10 TABLET ORAL
Qty: 30 TABLET | Refills: 0 | Status: SHIPPED | OUTPATIENT
Start: 2023-01-06 | End: 2023-02-05

## 2023-01-06 RX ORDER — DEXTROAMPHETAMINE SACCHARATE, AMPHETAMINE ASPARTATE MONOHYDRATE, DEXTROAMPHETAMINE SULFATE AND AMPHETAMINE SULFATE 2.5; 2.5; 2.5; 2.5 MG/1; MG/1; MG/1; MG/1
10 CAPSULE, EXTENDED RELEASE ORAL EVERY MORNING
Qty: 30 CAPSULE | Refills: 0 | Status: SHIPPED | OUTPATIENT
Start: 2023-01-06 | End: 2023-02-05

## 2023-01-16 ENCOUNTER — TELEPHONE (OUTPATIENT)
Dept: PULMONOLOGY | Age: 28
End: 2023-01-16

## 2023-01-16 NOTE — TELEPHONE ENCOUNTER
Kelsey called because [facility] states that they do not have her scripts for Adderral. I called [facility] and spoke with Robert Espinoza and they did have them but they were on hold, he will get them ready. Patient notified.

## 2023-01-30 ENCOUNTER — TELEPHONE (OUTPATIENT)
Dept: PULMONOLOGY | Age: 28
End: 2023-01-30

## 2023-02-01 DIAGNOSIS — G47.411 NARCOLEPSY AND CATAPLEXY: ICD-10-CM

## 2023-02-01 RX ORDER — DEXTROAMPHETAMINE SACCHARATE, AMPHETAMINE ASPARTATE, DEXTROAMPHETAMINE SULFATE AND AMPHETAMINE SULFATE 2.5; 2.5; 2.5; 2.5 MG/1; MG/1; MG/1; MG/1
10 TABLET ORAL
Qty: 30 TABLET | Refills: 0 | Status: SHIPPED | OUTPATIENT
Start: 2023-02-01 | End: 2023-03-03

## 2023-02-01 RX ORDER — DEXTROAMPHETAMINE SACCHARATE, AMPHETAMINE ASPARTATE MONOHYDRATE, DEXTROAMPHETAMINE SULFATE AND AMPHETAMINE SULFATE 2.5; 2.5; 2.5; 2.5 MG/1; MG/1; MG/1; MG/1
10 CAPSULE, EXTENDED RELEASE ORAL EVERY MORNING
Qty: 30 CAPSULE | Refills: 0 | Status: SHIPPED | OUTPATIENT
Start: 2023-02-01 | End: 2023-03-03

## 2023-02-01 NOTE — TELEPHONE ENCOUNTER
Received refill request for amphetamine-dextroamphetamine (ADDERALL XR) 10 MG extended release capsule. Medication was last ordered by ashley fernandez. Medication was last ordered on 1.6.23 with 0 refills. Received refill request for amphetamine-dextroamphetamine (ADDERALL, 10MG,) 10 MG tablet. Medication was last ordered by ashley fernandez. Medication was last ordered on 1.6.23 with 0 refills. Patient was last seen in the office 12.2.22. Patient has a scheduled follow up 3.3.23. Medication needs to be sent to [address] [place] [address].

## 2023-02-16 ENCOUNTER — TELEPHONE (OUTPATIENT)
Dept: PULMONOLOGY | Age: 28
End: 2023-02-16

## 2023-03-02 NOTE — PROGRESS NOTES
[place] [facility]      Thuan Lindsay         [id number]  3/3/2023   Chief Complaint   Patient presents with    Follow-up     3mo Narcolepsy and Cataplexy f/u med check. At last visit, pt was continued on Adderall, Adderall XR, Xyrem, and her Wakix was increased to 35.6mg. Here for f/u. Notes her meds are working better. Pt of Karlene Puente CNP     PAP Download:   Original or initial AHI: 8.57     Date of initial study: 6/14/2019       Neck Size: 17.5  Mallampati 3  ESS:  8  SAQLI: 94      Presentation:   Halima Moran presents for 3 monthssleep medicine follow up for obstructive sleep apnea, narcolepsy  Since the last visit, Halima Moran h/o mild FREDY, could not tolerate PAP therapy . Taking Adderall 10 mg XR daily in AM, taking Adderral 10 mg daily in afternoon, Pitolisant 35.6 mg daily in AM, we doubled the Xyrem last visit to 3.75 g  twice a night. Taking Prosac 10 mg PO daily for cataplexy ( well controlled) , denies any mood disturbances , denies any   Still taking Iron 325 mg daily with breakfast , has not had iron levels checked in long time   Since last visit she is feeling much better, feels the increase in Xyrem has been very helpful   Ess 8, denies sleepiness with driving , no work related issues due to sleepiness     Progress History:   Since last visit any new medical issues? No  Trouble Falling Asleep No  Trouble Staying Asleep No    Review of Systems -   Review of Systems   Constitutional:  Negative for activity change, appetite change, chills, fatigue, fever and unexpected weight change. HENT: Negative. Eyes: Negative. Respiratory:  Negative for cough, shortness of breath and wheezing. Cardiovascular:  Negative for chest pain, palpitations and leg swelling. Gastrointestinal:  Negative for abdominal pain, diarrhea, nausea and vomiting. Genitourinary: Negative. Musculoskeletal: Negative. Skin: Negative. Neurological: Negative. Hematological: Negative. Psychiatric/Behavioral: Negative. Negative for sleep disturbance. Physical Exam:    BMI:  Body mass index is 50.85 kg/m². Wt Readings from Last 3 Encounters:   03/03/23 (!) 334 lb 6.4 oz (151.7 kg)   12/02/22 (!) 331 lb (150.1 kg)   09/02/22 (!) 330 lb 9.6 oz (150 kg)     Weight stable / unchanged  Vitals: /76 (Site: Right Lower Arm, Position: Sitting, Cuff Size: Medium Adult)   Pulse 97   Temp 98.5 °F (36.9 °C) (Oral)   Ht 5' 8\" (1.727 m)   Wt (!) 334 lb 6.4 oz (151.7 kg)   SpO2 96% Comment: r/a  BMI 50.85 kg/m²       Physical Exam  Vitals and nursing note reviewed. Constitutional:       Appearance: Normal appearance. She is obese. HENT:      Head: Normocephalic and atraumatic. Mouth/Throat:      Pharynx: Oropharynx is clear. Eyes:      Conjunctiva/sclera: Conjunctivae normal.   Pulmonary:      Effort: Pulmonary effort is normal. No tachypnea, bradypnea or respiratory distress. Skin:     Findings: No erythema or rash. Neurological:      Mental Status: She is alert and oriented to person, place, and time. Psychiatric:         Attention and Perception: Attention normal.         Mood and Affect: Mood normal.         Speech: Speech normal.         Behavior: Behavior normal.         Thought Content: Thought content normal.         Cognition and Memory: Cognition normal.         Judgment: Judgment normal.         ASSESSMENT/DIAGNOSIS     Diagnosis Orders   1. Narcolepsy and cataplexy  amphetamine-dextroamphetamine (ADDERALL XR) 10 MG extended release capsule    amphetamine-dextroamphetamine (ADDERALL, 10MG,) 10 MG tablet      2. Low iron  Iron      3. Morbid obesity with BMI of 45.0-49.9, adult ([facility])                 Plan   -narcolepsy and cataplexy are currently well controlled, continue current therapies at current doses.    Refills escribed for Adderrall per request.   -lab slip to check iron level, needs to fast for 12 hours   - Recommend 7-9 hours of sleep   -reinforced continued good sleep hygiene practices     -She call my office for earlier appointment if needed for worsening of sleep symptoms.   - She was counseled on obesity and need for weight loss   - Anasco was educated about my impression and plan. - pt was educated on good sleep hygiene practices     Patient verbalizes understanding.   We will see Jayna Anderson back in: 1 year     Information added by my medical assistant/LPN was reviewed today    billing based on medical decision making     SHANTEL Calderon-RIGOBERTO   3/3/2023

## 2023-03-03 ENCOUNTER — OFFICE VISIT (OUTPATIENT)
Dept: PULMONOLOGY | Age: 28
End: 2023-03-03
Payer: COMMERCIAL

## 2023-03-03 VITALS
HEIGHT: 68 IN | SYSTOLIC BLOOD PRESSURE: 118 MMHG | OXYGEN SATURATION: 96 % | TEMPERATURE: 98.5 F | BODY MASS INDEX: 44.41 KG/M2 | HEART RATE: 97 BPM | WEIGHT: 293 LBS | DIASTOLIC BLOOD PRESSURE: 76 MMHG

## 2023-03-03 DIAGNOSIS — E61.1 LOW IRON: ICD-10-CM

## 2023-03-03 DIAGNOSIS — G47.411 NARCOLEPSY AND CATAPLEXY: Primary | ICD-10-CM

## 2023-03-03 DIAGNOSIS — E66.01 MORBID OBESITY WITH BMI OF 45.0-49.9, ADULT (HCC): ICD-10-CM

## 2023-03-03 PROCEDURE — 1036F TOBACCO NON-USER: CPT | Performed by: NURSE PRACTITIONER

## 2023-03-03 PROCEDURE — G8417 CALC BMI ABV UP PARAM F/U: HCPCS | Performed by: NURSE PRACTITIONER

## 2023-03-03 PROCEDURE — G8484 FLU IMMUNIZE NO ADMIN: HCPCS | Performed by: NURSE PRACTITIONER

## 2023-03-03 PROCEDURE — 99214 OFFICE O/P EST MOD 30 MIN: CPT | Performed by: NURSE PRACTITIONER

## 2023-03-03 PROCEDURE — G8427 DOCREV CUR MEDS BY ELIG CLIN: HCPCS | Performed by: NURSE PRACTITIONER

## 2023-03-03 RX ORDER — DEXTROAMPHETAMINE SACCHARATE, AMPHETAMINE ASPARTATE, DEXTROAMPHETAMINE SULFATE AND AMPHETAMINE SULFATE 2.5; 2.5; 2.5; 2.5 MG/1; MG/1; MG/1; MG/1
10 TABLET ORAL
Qty: 30 TABLET | Refills: 0 | Status: SHIPPED | OUTPATIENT
Start: 2023-03-03 | End: 2023-04-02

## 2023-03-03 RX ORDER — DEXTROAMPHETAMINE SACCHARATE, AMPHETAMINE ASPARTATE MONOHYDRATE, DEXTROAMPHETAMINE SULFATE AND AMPHETAMINE SULFATE 2.5; 2.5; 2.5; 2.5 MG/1; MG/1; MG/1; MG/1
10 CAPSULE, EXTENDED RELEASE ORAL EVERY MORNING
Qty: 30 CAPSULE | Refills: 0 | Status: SHIPPED | OUTPATIENT
Start: 2023-03-03 | End: 2023-04-02

## 2023-03-03 ASSESSMENT — ENCOUNTER SYMPTOMS
SHORTNESS OF BREATH: 0
EYES NEGATIVE: 1
VOMITING: 0
WHEEZING: 0
DIARRHEA: 0
COUGH: 0
ABDOMINAL PAIN: 0
NAUSEA: 0

## 2023-05-01 DIAGNOSIS — G47.411 NARCOLEPSY AND CATAPLEXY: ICD-10-CM

## 2023-05-01 RX ORDER — DEXTROAMPHETAMINE SACCHARATE, AMPHETAMINE ASPARTATE MONOHYDRATE, DEXTROAMPHETAMINE SULFATE AND AMPHETAMINE SULFATE 2.5; 2.5; 2.5; 2.5 MG/1; MG/1; MG/1; MG/1
10 CAPSULE, EXTENDED RELEASE ORAL EVERY MORNING
Qty: 30 CAPSULE | Refills: 0 | OUTPATIENT
Start: 2023-05-01 | End: 2023-05-31

## 2023-05-01 RX ORDER — DEXTROAMPHETAMINE SACCHARATE, AMPHETAMINE ASPARTATE, DEXTROAMPHETAMINE SULFATE AND AMPHETAMINE SULFATE 2.5; 2.5; 2.5; 2.5 MG/1; MG/1; MG/1; MG/1
10 TABLET ORAL
Qty: 30 TABLET | Refills: 0 | OUTPATIENT
Start: 2023-05-01 | End: 2023-05-31

## 2023-05-08 RX ORDER — FLUOXETINE 10 MG/1
CAPSULE ORAL
Qty: 90 CAPSULE | Refills: 1 | Status: SHIPPED | OUTPATIENT
Start: 2023-05-08

## 2023-05-24 NOTE — TELEPHONE ENCOUNTER
Received refill request for [facility] [place]. Medication was last ordered by ashley. Medication was last ordered on 12/2/22 with 5 refills. Patient was last seen in the office 3/3/23. Does patient have a scheduled follow up?: yes - 3/1/24    Medication needs to be sent to [facility]. Thank you, please advise!     Patient's Allergies:  No Known Allergies

## 2023-06-01 DIAGNOSIS — G47.411 NARCOLEPSY AND CATAPLEXY: ICD-10-CM

## 2023-06-05 RX ORDER — DEXTROAMPHETAMINE SACCHARATE, AMPHETAMINE ASPARTATE MONOHYDRATE, DEXTROAMPHETAMINE SULFATE AND AMPHETAMINE SULFATE 2.5; 2.5; 2.5; 2.5 MG/1; MG/1; MG/1; MG/1
10 CAPSULE, EXTENDED RELEASE ORAL EVERY MORNING
Qty: 30 CAPSULE | Refills: 0 | Status: SHIPPED | OUTPATIENT
Start: 2023-06-05 | End: 2023-07-05

## 2023-06-05 RX ORDER — DEXTROAMPHETAMINE SACCHARATE, AMPHETAMINE ASPARTATE, DEXTROAMPHETAMINE SULFATE AND AMPHETAMINE SULFATE 2.5; 2.5; 2.5; 2.5 MG/1; MG/1; MG/1; MG/1
10 TABLET ORAL
Qty: 30 TABLET | Refills: 0 | Status: SHIPPED | OUTPATIENT
Start: 2023-06-05 | End: 2023-07-05

## 2023-07-04 DIAGNOSIS — G47.411 NARCOLEPSY AND CATAPLEXY: ICD-10-CM

## 2023-07-05 RX ORDER — DEXTROAMPHETAMINE SACCHARATE, AMPHETAMINE ASPARTATE, DEXTROAMPHETAMINE SULFATE AND AMPHETAMINE SULFATE 2.5; 2.5; 2.5; 2.5 MG/1; MG/1; MG/1; MG/1
10 TABLET ORAL
Qty: 30 TABLET | Refills: 0 | Status: SHIPPED | OUTPATIENT
Start: 2023-07-05 | End: 2023-08-04

## 2023-07-05 NOTE — TELEPHONE ENCOUNTER
Received refill request for Adderall 10mg. Medication was last ordered by Romelia Dover. Medication was last ordered on 6/5/23 with 0 refills. Patient was last seen in the office 3/3/23. Does patient have a scheduled follow up?: yes - 3/1/24. Medication needs to be sent to [address] [place] [address]. Thank you, please advise!     Patient's Allergies:  No Known Allergies

## 2023-07-07 RX ORDER — DEXTROAMPHETAMINE SACCHARATE, AMPHETAMINE ASPARTATE MONOHYDRATE, DEXTROAMPHETAMINE SULFATE AND AMPHETAMINE SULFATE 2.5; 2.5; 2.5; 2.5 MG/1; MG/1; MG/1; MG/1
10 CAPSULE, EXTENDED RELEASE ORAL EVERY MORNING
Qty: 30 CAPSULE | Refills: 0 | Status: SHIPPED | OUTPATIENT
Start: 2023-07-07 | End: 2023-08-06

## 2023-08-03 DIAGNOSIS — G47.411 NARCOLEPSY AND CATAPLEXY: ICD-10-CM

## 2023-08-04 RX ORDER — DEXTROAMPHETAMINE SACCHARATE, AMPHETAMINE ASPARTATE MONOHYDRATE, DEXTROAMPHETAMINE SULFATE AND AMPHETAMINE SULFATE 2.5; 2.5; 2.5; 2.5 MG/1; MG/1; MG/1; MG/1
10 CAPSULE, EXTENDED RELEASE ORAL EVERY MORNING
Qty: 30 CAPSULE | Refills: 0 | Status: SHIPPED | OUTPATIENT
Start: 2023-08-04 | End: 2023-09-03

## 2023-08-04 RX ORDER — DEXTROAMPHETAMINE SACCHARATE, AMPHETAMINE ASPARTATE, DEXTROAMPHETAMINE SULFATE AND AMPHETAMINE SULFATE 2.5; 2.5; 2.5; 2.5 MG/1; MG/1; MG/1; MG/1
10 TABLET ORAL
Qty: 30 TABLET | Refills: 0 | Status: SHIPPED | OUTPATIENT
Start: 2023-08-04 | End: 2023-09-03

## 2023-09-05 ENCOUNTER — TELEPHONE (OUTPATIENT)
Dept: PULMONOLOGY | Age: 28
End: 2023-09-05

## 2023-09-05 DIAGNOSIS — G47.411 NARCOLEPSY AND CATAPLEXY: ICD-10-CM

## 2023-09-05 NOTE — TELEPHONE ENCOUNTER
Submitted prior authorization for WAKIX 17.8 MG today. -  I will update this encounter once a determination has been received.

## 2023-09-07 NOTE — TELEPHONE ENCOUNTER
wakix prior authorization was approved.  See media for approval.  -  Approval dates: [id number] - [id number]

## 2023-09-11 DIAGNOSIS — G47.411 NARCOLEPSY AND CATAPLEXY: ICD-10-CM

## 2023-09-11 RX ORDER — DEXTROAMPHETAMINE SACCHARATE, AMPHETAMINE ASPARTATE MONOHYDRATE, DEXTROAMPHETAMINE SULFATE AND AMPHETAMINE SULFATE 2.5; 2.5; 2.5; 2.5 MG/1; MG/1; MG/1; MG/1
10 CAPSULE, EXTENDED RELEASE ORAL EVERY MORNING
Qty: 30 CAPSULE | Refills: 0 | Status: SHIPPED | OUTPATIENT
Start: 2023-09-11 | End: 2023-10-11

## 2023-09-11 RX ORDER — DEXTROAMPHETAMINE SACCHARATE, AMPHETAMINE ASPARTATE, DEXTROAMPHETAMINE SULFATE AND AMPHETAMINE SULFATE 2.5; 2.5; 2.5; 2.5 MG/1; MG/1; MG/1; MG/1
10 TABLET ORAL
Qty: 30 TABLET | Refills: 0 | Status: SHIPPED | OUTPATIENT
Start: 2023-09-11 | End: 2023-10-11

## 2023-09-13 RX ORDER — DEXTROAMPHETAMINE SACCHARATE, AMPHETAMINE ASPARTATE MONOHYDRATE, DEXTROAMPHETAMINE SULFATE AND AMPHETAMINE SULFATE 2.5; 2.5; 2.5; 2.5 MG/1; MG/1; MG/1; MG/1
10 CAPSULE, EXTENDED RELEASE ORAL EVERY MORNING
Qty: 30 CAPSULE | Refills: 0 | OUTPATIENT
Start: 2023-09-13 | End: 2023-10-13

## 2023-09-13 RX ORDER — DEXTROAMPHETAMINE SACCHARATE, AMPHETAMINE ASPARTATE, DEXTROAMPHETAMINE SULFATE AND AMPHETAMINE SULFATE 2.5; 2.5; 2.5; 2.5 MG/1; MG/1; MG/1; MG/1
10 TABLET ORAL
Qty: 30 TABLET | Refills: 0 | OUTPATIENT
Start: 2023-09-13 | End: 2023-10-13

## 2023-09-25 ENCOUNTER — TELEPHONE (OUTPATIENT)
Dept: PULMONOLOGY | Age: 28
End: 2023-09-25

## 2023-09-25 NOTE — TELEPHONE ENCOUNTER
Submitted prior authorization for XYREM today. -  I will update this encounter once a determination has been received.

## 2023-10-13 DIAGNOSIS — G47.411 NARCOLEPSY AND CATAPLEXY: ICD-10-CM

## 2023-10-19 RX ORDER — DEXTROAMPHETAMINE SACCHARATE, AMPHETAMINE ASPARTATE, DEXTROAMPHETAMINE SULFATE AND AMPHETAMINE SULFATE 2.5; 2.5; 2.5; 2.5 MG/1; MG/1; MG/1; MG/1
10 TABLET ORAL
Qty: 30 TABLET | Refills: 0 | Status: SHIPPED | OUTPATIENT
Start: 2023-10-19 | End: 2023-11-18

## 2023-10-19 RX ORDER — FLUOXETINE 10 MG/1
10 CAPSULE ORAL DAILY
Qty: 90 CAPSULE | Refills: 3 | Status: SHIPPED | OUTPATIENT
Start: 2023-10-19

## 2023-10-19 RX ORDER — DEXTROAMPHETAMINE SACCHARATE, AMPHETAMINE ASPARTATE MONOHYDRATE, DEXTROAMPHETAMINE SULFATE AND AMPHETAMINE SULFATE 2.5; 2.5; 2.5; 2.5 MG/1; MG/1; MG/1; MG/1
10 CAPSULE, EXTENDED RELEASE ORAL EVERY MORNING
Qty: 30 CAPSULE | Refills: 0 | Status: SHIPPED | OUTPATIENT
Start: 2023-10-19 | End: 2023-11-18

## 2023-10-19 NOTE — TELEPHONE ENCOUNTER
Received refill request for prozac. Medication was last ordered by ashley fernandez. Medication was last ordered on 5.8. 23 with 1 refills. Patient was last seen in the office 3.3.23. Patient has a scheduled follow up 3. 1. 24. Medication needs to be sent to [facility]

## 2023-10-19 NOTE — TELEPHONE ENCOUNTER
Received refill request for adderall 10 mg & adderall 10 mg xr. Medication was last ordered by ashley fernandez. Medication was last ordered on 9.11.23 with 0 refills. Patient was last seen in the office 3.3.23. Patient has a scheduled follow up 3. 1. 24. Medication needs to be sent to [facility]

## 2023-11-16 DIAGNOSIS — G47.411 NARCOLEPSY AND CATAPLEXY: ICD-10-CM

## 2023-11-19 DIAGNOSIS — G47.411 NARCOLEPSY AND CATAPLEXY: ICD-10-CM

## 2023-11-21 RX ORDER — DEXTROAMPHETAMINE SACCHARATE, AMPHETAMINE ASPARTATE MONOHYDRATE, DEXTROAMPHETAMINE SULFATE AND AMPHETAMINE SULFATE 2.5; 2.5; 2.5; 2.5 MG/1; MG/1; MG/1; MG/1
10 CAPSULE, EXTENDED RELEASE ORAL EVERY MORNING
Qty: 30 CAPSULE | Refills: 0 | OUTPATIENT
Start: 2023-11-21 | End: 2023-12-21

## 2023-11-21 RX ORDER — DEXTROAMPHETAMINE SACCHARATE, AMPHETAMINE ASPARTATE, DEXTROAMPHETAMINE SULFATE AND AMPHETAMINE SULFATE 2.5; 2.5; 2.5; 2.5 MG/1; MG/1; MG/1; MG/1
10 TABLET ORAL
Qty: 30 TABLET | Refills: 0 | OUTPATIENT
Start: 2023-11-21 | End: 2023-12-21

## 2023-11-21 RX ORDER — DEXTROAMPHETAMINE SACCHARATE, AMPHETAMINE ASPARTATE, DEXTROAMPHETAMINE SULFATE AND AMPHETAMINE SULFATE 2.5; 2.5; 2.5; 2.5 MG/1; MG/1; MG/1; MG/1
10 TABLET ORAL
Qty: 30 TABLET | Refills: 0 | Status: SHIPPED | OUTPATIENT
Start: 2023-11-21 | End: 2023-12-21

## 2023-11-21 RX ORDER — DEXTROAMPHETAMINE SACCHARATE, AMPHETAMINE ASPARTATE MONOHYDRATE, DEXTROAMPHETAMINE SULFATE AND AMPHETAMINE SULFATE 2.5; 2.5; 2.5; 2.5 MG/1; MG/1; MG/1; MG/1
10 CAPSULE, EXTENDED RELEASE ORAL EVERY MORNING
Qty: 30 CAPSULE | Refills: 0 | Status: SHIPPED | OUTPATIENT
Start: 2023-11-21 | End: 2023-12-21

## 2023-12-09 DIAGNOSIS — G47.411 NARCOLEPSY AND CATAPLEXY: ICD-10-CM

## 2023-12-11 DIAGNOSIS — G47.411 NARCOLEPSY AND CATAPLEXY: ICD-10-CM

## 2023-12-11 RX ORDER — DEXTROAMPHETAMINE SACCHARATE, AMPHETAMINE ASPARTATE, DEXTROAMPHETAMINE SULFATE AND AMPHETAMINE SULFATE 2.5; 2.5; 2.5; 2.5 MG/1; MG/1; MG/1; MG/1
10 TABLET ORAL
Qty: 30 TABLET | Refills: 0 | Status: SHIPPED | OUTPATIENT
Start: 2023-12-21 | End: 2024-01-20

## 2023-12-11 RX ORDER — SODIUM OXYBATE 0.5 G/ML
3.75 SOLUTION ORAL 2 TIMES DAILY
Qty: 450 ML | Refills: 5 | Status: SHIPPED | OUTPATIENT
Start: 2023-12-11 | End: 2024-06-08

## 2023-12-11 RX ORDER — DEXTROAMPHETAMINE SACCHARATE, AMPHETAMINE ASPARTATE MONOHYDRATE, DEXTROAMPHETAMINE SULFATE AND AMPHETAMINE SULFATE 2.5; 2.5; 2.5; 2.5 MG/1; MG/1; MG/1; MG/1
10 CAPSULE, EXTENDED RELEASE ORAL EVERY MORNING
Qty: 30 CAPSULE | Refills: 0 | Status: SHIPPED | OUTPATIENT
Start: 2023-12-21 | End: 2024-01-20

## 2023-12-11 NOTE — TELEPHONE ENCOUNTER
Received a refill request for patients Xyrem from [facility] pharmacy.    Last ordered 7/13/2023  Last visit 03/03/2023  Next visit 03/01/2024

## 2024-01-07 DIAGNOSIS — G47.411 NARCOLEPSY AND CATAPLEXY: ICD-10-CM

## 2024-01-08 RX ORDER — DEXTROAMPHETAMINE SACCHARATE, AMPHETAMINE ASPARTATE MONOHYDRATE, DEXTROAMPHETAMINE SULFATE AND AMPHETAMINE SULFATE 2.5; 2.5; 2.5; 2.5 MG/1; MG/1; MG/1; MG/1
10 CAPSULE, EXTENDED RELEASE ORAL EVERY MORNING
Qty: 30 CAPSULE | Refills: 0 | Status: SHIPPED | OUTPATIENT
Start: 2024-01-08 | End: 2024-02-07

## 2024-01-08 RX ORDER — DEXTROAMPHETAMINE SACCHARATE, AMPHETAMINE ASPARTATE, DEXTROAMPHETAMINE SULFATE AND AMPHETAMINE SULFATE 2.5; 2.5; 2.5; 2.5 MG/1; MG/1; MG/1; MG/1
10 TABLET ORAL
Qty: 30 TABLET | Refills: 0 | Status: SHIPPED | OUTPATIENT
Start: 2024-01-08 | End: 2024-02-07

## 2024-02-20 DIAGNOSIS — G47.411 NARCOLEPSY AND CATAPLEXY: ICD-10-CM

## 2024-02-21 RX ORDER — DEXTROAMPHETAMINE SACCHARATE, AMPHETAMINE ASPARTATE, DEXTROAMPHETAMINE SULFATE AND AMPHETAMINE SULFATE 2.5; 2.5; 2.5; 2.5 MG/1; MG/1; MG/1; MG/1
10 TABLET ORAL
Qty: 30 TABLET | Refills: 0 | OUTPATIENT
Start: 2024-02-21 | End: 2024-03-22

## 2024-02-21 RX ORDER — DEXTROAMPHETAMINE SACCHARATE, AMPHETAMINE ASPARTATE MONOHYDRATE, DEXTROAMPHETAMINE SULFATE AND AMPHETAMINE SULFATE 2.5; 2.5; 2.5; 2.5 MG/1; MG/1; MG/1; MG/1
10 CAPSULE, EXTENDED RELEASE ORAL EVERY MORNING
Qty: 30 CAPSULE | Refills: 0 | OUTPATIENT
Start: 2024-02-21 | End: 2024-03-22

## 2024-02-23 DIAGNOSIS — G47.411 NARCOLEPSY AND CATAPLEXY: ICD-10-CM

## 2024-02-26 RX ORDER — DEXTROAMPHETAMINE SACCHARATE, AMPHETAMINE ASPARTATE MONOHYDRATE, DEXTROAMPHETAMINE SULFATE AND AMPHETAMINE SULFATE 2.5; 2.5; 2.5; 2.5 MG/1; MG/1; MG/1; MG/1
10 CAPSULE, EXTENDED RELEASE ORAL EVERY MORNING
Qty: 30 CAPSULE | Refills: 0 | OUTPATIENT
Start: 2024-02-26 | End: 2024-03-27

## 2024-02-26 RX ORDER — DEXTROAMPHETAMINE SACCHARATE, AMPHETAMINE ASPARTATE, DEXTROAMPHETAMINE SULFATE AND AMPHETAMINE SULFATE 2.5; 2.5; 2.5; 2.5 MG/1; MG/1; MG/1; MG/1
10 TABLET ORAL
Qty: 30 TABLET | Refills: 0 | OUTPATIENT
Start: 2024-02-26 | End: 2024-03-27

## 2024-02-26 RX ORDER — DEXTROAMPHETAMINE SACCHARATE, AMPHETAMINE ASPARTATE, DEXTROAMPHETAMINE SULFATE AND AMPHETAMINE SULFATE 2.5; 2.5; 2.5; 2.5 MG/1; MG/1; MG/1; MG/1
10 TABLET ORAL
Qty: 30 TABLET | Refills: 0 | Status: SHIPPED | OUTPATIENT
Start: 2024-02-26 | End: 2024-03-27

## 2024-02-26 RX ORDER — DEXTROAMPHETAMINE SACCHARATE, AMPHETAMINE ASPARTATE MONOHYDRATE, DEXTROAMPHETAMINE SULFATE AND AMPHETAMINE SULFATE 2.5; 2.5; 2.5; 2.5 MG/1; MG/1; MG/1; MG/1
10 CAPSULE, EXTENDED RELEASE ORAL EVERY MORNING
Qty: 30 CAPSULE | Refills: 0 | Status: SHIPPED | OUTPATIENT
Start: 2024-02-26 | End: 2024-03-27

## 2024-03-01 ENCOUNTER — OFFICE VISIT (OUTPATIENT)
Dept: PULMONOLOGY | Age: 29
End: 2024-03-01
Payer: COMMERCIAL

## 2024-03-01 VITALS
TEMPERATURE: 98 F | BODY MASS INDEX: 44.41 KG/M2 | SYSTOLIC BLOOD PRESSURE: 122 MMHG | DIASTOLIC BLOOD PRESSURE: 80 MMHG | OXYGEN SATURATION: 97 % | HEART RATE: 111 BPM | HEIGHT: 68 IN | WEIGHT: 293 LBS

## 2024-03-01 DIAGNOSIS — G47.33 OBSTRUCTIVE SLEEP APNEA HYPOPNEA, MILD: ICD-10-CM

## 2024-03-01 DIAGNOSIS — G47.411 NARCOLEPSY AND CATAPLEXY: Primary | ICD-10-CM

## 2024-03-01 DIAGNOSIS — E66.01 MORBID OBESITY WITH BMI OF 45.0-49.9, ADULT (HCC): ICD-10-CM

## 2024-03-01 DIAGNOSIS — E61.1 LOW IRON: ICD-10-CM

## 2024-03-01 DIAGNOSIS — Z78.9 INTOLERANCE OF CONTINUOUS POSITIVE AIRWAY PRESSURE (CPAP) VENTILATION: ICD-10-CM

## 2024-03-01 PROCEDURE — G8427 DOCREV CUR MEDS BY ELIG CLIN: HCPCS | Performed by: NURSE PRACTITIONER

## 2024-03-01 PROCEDURE — G8417 CALC BMI ABV UP PARAM F/U: HCPCS | Performed by: NURSE PRACTITIONER

## 2024-03-01 PROCEDURE — G8484 FLU IMMUNIZE NO ADMIN: HCPCS | Performed by: NURSE PRACTITIONER

## 2024-03-01 PROCEDURE — 1036F TOBACCO NON-USER: CPT | Performed by: NURSE PRACTITIONER

## 2024-03-01 PROCEDURE — 99214 OFFICE O/P EST MOD 30 MIN: CPT | Performed by: NURSE PRACTITIONER

## 2024-03-01 ASSESSMENT — ENCOUNTER SYMPTOMS
EYES NEGATIVE: 1
SHORTNESS OF BREATH: 0
COUGH: 0
WHEEZING: 0
VOMITING: 0
NAUSEA: 0
DIARRHEA: 0
ABDOMINAL PAIN: 0

## 2024-03-01 NOTE — PROGRESS NOTES
[facility]      Juliet Dumont         [id number]  3/1/2024   Chief Complaint   Patient presents with    Follow-up     1 year narcolepsy & cataplexy follow up, iron lab 2/20/24.         Pt of Karlene Puente CNP    PAP Download:   Original or initial AHI: 8.57     Date of initial study: 6/14/19      Neck Size: 17 inches  Mallampati 3  ESS:  9 ( stable from last year)   SAQLI: 93    Presentation:   Juliet presents for 1 yearsleep medicine follow up for narcolepsy, cataplexy  Since the last visit, Juliet Taking Adderall 10 mg XR daily in AM, taking Adderral 10 mg daily in afternoon- takes most days , Pitolisant 35.6 mg daily in AM, Xyrem  3.75 g  twice a night.   Feels good, sleeping well, denies excessive daytime sleepiness, able to work without sleepiness, no sleepiness with driving.     Taking Prosac 10 mg PO daily for cataplexy ( well controlled) , denies any mood disturbances , denies any   Still taking Iron 325 mg BID , recent Ferritin level ( 95)   Since last visit she is feeling much better, feels the increase in Xyrem has been very helpful   She is not following a low sodium diet.       Review of Systems -   Review of Systems   Constitutional:  Negative for activity change, appetite change, chills, fatigue, fever and unexpected weight change.   HENT: Negative.     Eyes: Negative.    Respiratory:  Negative for cough, shortness of breath and wheezing.    Cardiovascular:  Negative for chest pain, palpitations and leg swelling.   Gastrointestinal:  Negative for abdominal pain, diarrhea, nausea and vomiting.   Genitourinary: Negative.    Musculoskeletal: Negative.    Skin: Negative.    Neurological: Negative.    Hematological: Negative.    Psychiatric/Behavioral: Negative.  Negative for sleep disturbance.         Physical Exam:    BMI:  Body mass index is 51.61 kg/m².    Wt Readings from Last 3 Encounters:   03/01/24 (!) 154 kg (339 lb 6.4 oz)   03/03/23 (!) 151.7 kg (334

## 2024-03-25 DIAGNOSIS — G47.411 NARCOLEPSY AND CATAPLEXY: ICD-10-CM

## 2024-03-25 RX ORDER — DEXTROAMPHETAMINE SACCHARATE, AMPHETAMINE ASPARTATE, DEXTROAMPHETAMINE SULFATE AND AMPHETAMINE SULFATE 2.5; 2.5; 2.5; 2.5 MG/1; MG/1; MG/1; MG/1
10 TABLET ORAL
Qty: 30 TABLET | Refills: 0 | Status: SHIPPED | OUTPATIENT
Start: 2024-03-25 | End: 2024-04-24

## 2024-03-25 RX ORDER — DEXTROAMPHETAMINE SACCHARATE, AMPHETAMINE ASPARTATE MONOHYDRATE, DEXTROAMPHETAMINE SULFATE AND AMPHETAMINE SULFATE 2.5; 2.5; 2.5; 2.5 MG/1; MG/1; MG/1; MG/1
10 CAPSULE, EXTENDED RELEASE ORAL EVERY MORNING
Qty: 30 CAPSULE | Refills: 0 | Status: SHIPPED | OUTPATIENT
Start: 2024-03-25 | End: 2024-04-24

## 2024-03-25 NOTE — TELEPHONE ENCOUNTER
Received refill request for  amphetamine-dextroamphetamine (ADDERALL, 10MG,) 10 MG tablet .   Medication was last ordered by Karlene Puente.   Medication was last ordered on 2/26/24 with 0 refills.     Received refill request for  amphetamine-dextroamphetamine (ADDERALL XR) 10 MG extended release capsule .   Medication was last ordered by Karlene Puente.   Medication was last ordered on 2/26/24 with 0 refills.     Patient was last seen in the office 3/1/24.   Does patient have a scheduled follow up?: yes - 9/6/24    Medication needs to be sent to [facility] [address] [place] [address] -  [phone] -  [phone] .     Thank you, please advise!    Patient's Allergies:  No Known Allergies

## 2024-03-26 RX ORDER — FLUOXETINE 10 MG/1
10 CAPSULE ORAL DAILY
Qty: 90 CAPSULE | Refills: 3 | Status: SHIPPED | OUTPATIENT
Start: 2024-03-26

## 2024-04-01 RX ORDER — PITOLISANT HYDROCHLORIDE 17.8 MG/1
TABLET, FILM COATED ORAL
Qty: 60 TABLET | Refills: 11 | OUTPATIENT
Start: 2024-04-01

## 2024-04-17 RX ORDER — PITOLISANT HYDROCHLORIDE 17.8 MG/1
TABLET, FILM COATED ORAL
Qty: 60 TABLET | Refills: 11 | Status: SHIPPED | OUTPATIENT
Start: 2024-04-17

## 2024-04-30 DIAGNOSIS — G47.411 NARCOLEPSY AND CATAPLEXY: ICD-10-CM

## 2024-04-30 RX ORDER — DEXTROAMPHETAMINE SACCHARATE, AMPHETAMINE ASPARTATE MONOHYDRATE, DEXTROAMPHETAMINE SULFATE AND AMPHETAMINE SULFATE 2.5; 2.5; 2.5; 2.5 MG/1; MG/1; MG/1; MG/1
10 CAPSULE, EXTENDED RELEASE ORAL EVERY MORNING
Qty: 30 CAPSULE | Refills: 0 | Status: SHIPPED | OUTPATIENT
Start: 2024-04-30 | End: 2024-05-30

## 2024-04-30 RX ORDER — DEXTROAMPHETAMINE SACCHARATE, AMPHETAMINE ASPARTATE, DEXTROAMPHETAMINE SULFATE AND AMPHETAMINE SULFATE 2.5; 2.5; 2.5; 2.5 MG/1; MG/1; MG/1; MG/1
10 TABLET ORAL
Qty: 30 TABLET | Refills: 0 | Status: SHIPPED | OUTPATIENT
Start: 2024-04-30 | End: 2024-05-30

## 2024-05-09 DIAGNOSIS — G47.411 NARCOLEPSY AND CATAPLEXY: ICD-10-CM

## 2024-05-09 RX ORDER — SODIUM OXYBATE 0.5 G/ML
3.75 SOLUTION ORAL 2 TIMES DAILY
Qty: 450 ML | Refills: 5 | Status: SHIPPED | OUTPATIENT
Start: 2024-05-09 | End: 2024-11-05

## 2024-05-09 NOTE — TELEPHONE ENCOUNTER
Received refill for Xyrem to [facility] pharmacy   Last ordered 12/11/2023  Last visit 31/2024  Next visit 9/6/2024

## 2024-05-13 ENCOUNTER — TELEPHONE (OUTPATIENT)
Dept: PULMONOLOGY | Age: 29
End: 2024-05-13

## 2024-05-13 DIAGNOSIS — G47.411 NARCOLEPSY AND CATAPLEXY: ICD-10-CM

## 2024-05-13 RX ORDER — SODIUM OXYBATE 0.5 G/ML
3.75 SOLUTION ORAL 2 TIMES DAILY
Qty: 450 ML | Refills: 5 | Status: SHIPPED | OUTPATIENT
Start: 2024-05-13 | End: 2024-11-09

## 2024-05-13 NOTE — TELEPHONE ENCOUNTER
Received a call from tatum from [facility] pharmacy regarding sodium oxybate, they need clarification on spacing of how many hours or the exact time pt needs to take it for bed. Tatum said its 2 times daily take one dose at bedtime and one dose at 2am, so she needs some clarification on how many hours apart. Please advise thank you

## 2024-05-26 DIAGNOSIS — G47.411 NARCOLEPSY AND CATAPLEXY: ICD-10-CM

## 2024-05-28 NOTE — TELEPHONE ENCOUNTER
Received refill request for ADDERALL. Medication was last ordered by JAVI. Medication was last ordered on 4.30.24 with 0 refills.     Received refill request for ADDERALL XR. Medication was last ordered by JAVI. Medication was last ordered on 4.30.24 with 0 refills.     Patient was last seen in the office 3.1.24. Patient has a scheduled follow up 9.6.24.   Medication needs to be sent to [facility].

## 2024-05-29 RX ORDER — DEXTROAMPHETAMINE SACCHARATE, AMPHETAMINE ASPARTATE, DEXTROAMPHETAMINE SULFATE AND AMPHETAMINE SULFATE 2.5; 2.5; 2.5; 2.5 MG/1; MG/1; MG/1; MG/1
10 TABLET ORAL
Qty: 30 TABLET | Refills: 0 | Status: SHIPPED | OUTPATIENT
Start: 2024-05-29 | End: 2024-06-28

## 2024-05-29 RX ORDER — DEXTROAMPHETAMINE SACCHARATE, AMPHETAMINE ASPARTATE MONOHYDRATE, DEXTROAMPHETAMINE SULFATE AND AMPHETAMINE SULFATE 2.5; 2.5; 2.5; 2.5 MG/1; MG/1; MG/1; MG/1
10 CAPSULE, EXTENDED RELEASE ORAL EVERY MORNING
Qty: 30 CAPSULE | Refills: 0 | Status: SHIPPED | OUTPATIENT
Start: 2024-05-29 | End: 2024-06-28

## 2024-07-01 DIAGNOSIS — G47.411 NARCOLEPSY AND CATAPLEXY: ICD-10-CM

## 2024-07-01 RX ORDER — DEXTROAMPHETAMINE SACCHARATE, AMPHETAMINE ASPARTATE MONOHYDRATE, DEXTROAMPHETAMINE SULFATE AND AMPHETAMINE SULFATE 2.5; 2.5; 2.5; 2.5 MG/1; MG/1; MG/1; MG/1
10 CAPSULE, EXTENDED RELEASE ORAL EVERY MORNING
Qty: 30 CAPSULE | Refills: 0 | Status: SHIPPED | OUTPATIENT
Start: 2024-07-01 | End: 2024-07-31

## 2024-07-01 RX ORDER — FLUOXETINE 10 MG/1
10 CAPSULE ORAL DAILY
Qty: 90 CAPSULE | Refills: 3 | Status: SHIPPED | OUTPATIENT
Start: 2024-07-01

## 2024-07-01 RX ORDER — DEXTROAMPHETAMINE SACCHARATE, AMPHETAMINE ASPARTATE, DEXTROAMPHETAMINE SULFATE AND AMPHETAMINE SULFATE 2.5; 2.5; 2.5; 2.5 MG/1; MG/1; MG/1; MG/1
10 TABLET ORAL
Qty: 30 TABLET | Refills: 0 | Status: SHIPPED | OUTPATIENT
Start: 2024-07-01 | End: 2024-07-31

## 2024-08-04 DIAGNOSIS — G47.411 NARCOLEPSY AND CATAPLEXY: ICD-10-CM

## 2024-08-05 RX ORDER — DEXTROAMPHETAMINE SACCHARATE, AMPHETAMINE ASPARTATE, DEXTROAMPHETAMINE SULFATE AND AMPHETAMINE SULFATE 2.5; 2.5; 2.5; 2.5 MG/1; MG/1; MG/1; MG/1
10 TABLET ORAL
Qty: 30 TABLET | Refills: 0 | Status: SHIPPED | OUTPATIENT
Start: 2024-08-05 | End: 2024-09-04

## 2024-08-05 RX ORDER — DEXTROAMPHETAMINE SACCHARATE, AMPHETAMINE ASPARTATE MONOHYDRATE, DEXTROAMPHETAMINE SULFATE AND AMPHETAMINE SULFATE 2.5; 2.5; 2.5; 2.5 MG/1; MG/1; MG/1; MG/1
10 CAPSULE, EXTENDED RELEASE ORAL EVERY MORNING
Qty: 30 CAPSULE | Refills: 0 | Status: SHIPPED | OUTPATIENT
Start: 2024-08-05 | End: 2024-09-04

## 2024-08-05 NOTE — TELEPHONE ENCOUNTER
Received refill request for Adderall XR 10mg. Medication was last ordered by Karlene Puente. Medication was last ordered on 7/1/24 with 0 refills.     Received refill request for Adderall 10mg. Medication was last ordered by Karlene Puente. Medication was last ordered on 7/1/24 with 0 refills.     Patient was last seen in the office 3/1/24. Patient has a scheduled follow up 9/6/24.   Medication needs to be sent to [facility].

## 2024-09-04 DIAGNOSIS — G47.411 NARCOLEPSY AND CATAPLEXY: ICD-10-CM

## 2024-09-05 RX ORDER — FLUOXETINE 10 MG/1
10 CAPSULE ORAL DAILY
Qty: 90 CAPSULE | Refills: 3 | Status: SHIPPED | OUTPATIENT
Start: 2024-09-05

## 2024-09-05 NOTE — TELEPHONE ENCOUNTER
Received refill request for prozac 10mg. Medication was last ordered by ashley fernandez. Medication was last ordered on 7-1-24 with 3 refills.   Patient was last seen in the office 3-1-24. Patient has a scheduled follow up 1-3-25.   Medication needs to be sent to [facility].

## 2024-09-09 RX ORDER — DEXTROAMPHETAMINE SACCHARATE, AMPHETAMINE ASPARTATE, DEXTROAMPHETAMINE SULFATE AND AMPHETAMINE SULFATE 2.5; 2.5; 2.5; 2.5 MG/1; MG/1; MG/1; MG/1
10 TABLET ORAL
Qty: 30 TABLET | Refills: 0 | Status: SHIPPED | OUTPATIENT
Start: 2024-09-09 | End: 2024-10-09

## 2024-09-09 RX ORDER — DEXTROAMPHETAMINE SACCHARATE, AMPHETAMINE ASPARTATE MONOHYDRATE, DEXTROAMPHETAMINE SULFATE AND AMPHETAMINE SULFATE 2.5; 2.5; 2.5; 2.5 MG/1; MG/1; MG/1; MG/1
10 CAPSULE, EXTENDED RELEASE ORAL EVERY MORNING
Qty: 30 CAPSULE | Refills: 0 | Status: SHIPPED | OUTPATIENT
Start: 2024-09-09 | End: 2024-10-09

## 2024-10-01 DIAGNOSIS — G47.411 NARCOLEPSY AND CATAPLEXY: ICD-10-CM

## 2024-10-01 RX ORDER — DEXTROAMPHETAMINE SACCHARATE, AMPHETAMINE ASPARTATE MONOHYDRATE, DEXTROAMPHETAMINE SULFATE AND AMPHETAMINE SULFATE 2.5; 2.5; 2.5; 2.5 MG/1; MG/1; MG/1; MG/1
10 CAPSULE, EXTENDED RELEASE ORAL EVERY MORNING
Qty: 30 CAPSULE | Refills: 0 | Status: SHIPPED | OUTPATIENT
Start: 2024-10-01 | End: 2024-10-31

## 2024-10-01 RX ORDER — DEXTROAMPHETAMINE SACCHARATE, AMPHETAMINE ASPARTATE, DEXTROAMPHETAMINE SULFATE AND AMPHETAMINE SULFATE 2.5; 2.5; 2.5; 2.5 MG/1; MG/1; MG/1; MG/1
10 TABLET ORAL
Qty: 30 TABLET | Refills: 0 | Status: SHIPPED | OUTPATIENT
Start: 2024-10-01 | End: 2024-10-31

## 2024-10-01 NOTE — TELEPHONE ENCOUNTER
Received refill request for Adderall XR. Medication was last ordered by Karlene. Medication was last ordered on 9/9/24 with 0 refills.   Patient was last seen in the office 3/1/24. Patient has a scheduled follow up 1/3/25.   Medication needs to be sent to [facility].      Received refill request for Adderall. Medication was last ordered by Karlene. Medication was last ordered on 9/9/24 with 0 refills.   Patient was last seen in the office 3/1/24. Patient has a scheduled follow up 1/3/24.   Medication needs to be sent to [facility].

## 2024-10-15 DIAGNOSIS — G47.411 NARCOLEPSY AND CATAPLEXY: ICD-10-CM

## 2024-10-15 RX ORDER — SODIUM OXYBATE 0.5 G/ML
3.75 SOLUTION ORAL 2 TIMES DAILY
Qty: 450 ML | Refills: 5 | Status: SHIPPED | OUTPATIENT
Start: 2024-10-15 | End: 2025-04-13

## 2024-10-15 NOTE — TELEPHONE ENCOUNTER
Received refill request from [facility] pharmacy for patients Kenny,   Last ordered 05/13/2024 with 5 refills by Karlene   Last visit 03/01/2024 with Karlene  Next visit 01/03/2025 with Karlene     Pharmacy and Medication loaded.   Thanks!

## 2024-11-25 DIAGNOSIS — G47.411 NARCOLEPSY AND CATAPLEXY: ICD-10-CM

## 2024-11-25 RX ORDER — DEXTROAMPHETAMINE SACCHARATE, AMPHETAMINE ASPARTATE MONOHYDRATE, DEXTROAMPHETAMINE SULFATE AND AMPHETAMINE SULFATE 2.5; 2.5; 2.5; 2.5 MG/1; MG/1; MG/1; MG/1
10 CAPSULE, EXTENDED RELEASE ORAL EVERY MORNING
Qty: 30 CAPSULE | Refills: 0 | Status: SHIPPED | OUTPATIENT
Start: 2024-11-25 | End: 2024-12-25

## 2024-11-25 RX ORDER — DEXTROAMPHETAMINE SACCHARATE, AMPHETAMINE ASPARTATE, DEXTROAMPHETAMINE SULFATE AND AMPHETAMINE SULFATE 2.5; 2.5; 2.5; 2.5 MG/1; MG/1; MG/1; MG/1
10 TABLET ORAL
Qty: 30 TABLET | Refills: 0 | Status: SHIPPED | OUTPATIENT
Start: 2024-11-25 | End: 2024-12-25

## 2024-11-25 NOTE — TELEPHONE ENCOUNTER
Received refill request for Adderall XR-Sleep.   Medication was last ordered by Karlene.   Medication was last ordered on 10/1/24 with 0 refills.     Received refill request for Adderall-Sleep.   Medication was last ordered by Karlene.   Medication was last ordered on 10/1/24 with 0 refills.     Patient was last seen in the office 3/1/24.   Does patient have a scheduled follow up?: yes - 1/3/25    Medication needs to be sent to [facility].     Thank you, please advise!    Patient's Allergies:  No Known Allergies

## 2024-11-27 RX ORDER — FLUOXETINE 10 MG/1
10 CAPSULE ORAL DAILY
Qty: 90 CAPSULE | Refills: 3 | OUTPATIENT
Start: 2024-11-27

## 2024-12-26 DIAGNOSIS — G47.411 NARCOLEPSY AND CATAPLEXY: ICD-10-CM

## 2024-12-27 NOTE — TELEPHONE ENCOUNTER
Received refill request for Adderall XR.   Medication was last ordered by Karlene.   Medication was last ordered on 11/25/24 with 0 refills.     Received refill request for Adderall.   Medication was last ordered by Karlene.   Medication was last ordered on 11/25/24 with 0 refills.     Patient was last seen in the office 3/1/24.   Does patient have a scheduled follow up?: yes - 1/3/25    Medication needs to be sent to [facility].     Thank you, please advise!    Patient's Allergies:  No Known Allergies

## 2024-12-30 RX ORDER — DEXTROAMPHETAMINE SACCHARATE, AMPHETAMINE ASPARTATE MONOHYDRATE, DEXTROAMPHETAMINE SULFATE AND AMPHETAMINE SULFATE 2.5; 2.5; 2.5; 2.5 MG/1; MG/1; MG/1; MG/1
10 CAPSULE, EXTENDED RELEASE ORAL EVERY MORNING
Qty: 30 CAPSULE | Refills: 0 | Status: SHIPPED | OUTPATIENT
Start: 2024-12-30 | End: 2025-01-29

## 2024-12-30 RX ORDER — DEXTROAMPHETAMINE SACCHARATE, AMPHETAMINE ASPARTATE, DEXTROAMPHETAMINE SULFATE AND AMPHETAMINE SULFATE 2.5; 2.5; 2.5; 2.5 MG/1; MG/1; MG/1; MG/1
10 TABLET ORAL
Qty: 30 TABLET | Refills: 0 | Status: SHIPPED | OUTPATIENT
Start: 2024-12-30 | End: 2025-01-29

## 2025-01-02 DIAGNOSIS — G47.411 NARCOLEPSY AND CATAPLEXY: ICD-10-CM

## 2025-01-02 DIAGNOSIS — E61.1 LOW IRON: ICD-10-CM

## 2025-01-03 ENCOUNTER — OFFICE VISIT (OUTPATIENT)
Dept: PULMONOLOGY | Age: 30
End: 2025-01-03

## 2025-01-03 VITALS
BODY MASS INDEX: 44.41 KG/M2 | WEIGHT: 293 LBS | TEMPERATURE: 98.5 F | OXYGEN SATURATION: 96 % | HEIGHT: 68 IN | HEART RATE: 89 BPM | DIASTOLIC BLOOD PRESSURE: 68 MMHG | SYSTOLIC BLOOD PRESSURE: 126 MMHG

## 2025-01-03 DIAGNOSIS — E66.01 MORBID OBESITY WITH BMI OF 45.0-49.9, ADULT: ICD-10-CM

## 2025-01-03 DIAGNOSIS — G47.411 NARCOLEPSY AND CATAPLEXY: Primary | ICD-10-CM

## 2025-01-03 DIAGNOSIS — G47.33 OBSTRUCTIVE SLEEP APNEA HYPOPNEA, MILD: ICD-10-CM

## 2025-01-03 DIAGNOSIS — Z78.9 INTOLERANCE OF CONTINUOUS POSITIVE AIRWAY PRESSURE (CPAP) VENTILATION: ICD-10-CM

## 2025-01-26 DIAGNOSIS — G47.411 NARCOLEPSY AND CATAPLEXY: ICD-10-CM

## 2025-01-27 RX ORDER — DEXTROAMPHETAMINE SACCHARATE, AMPHETAMINE ASPARTATE, DEXTROAMPHETAMINE SULFATE AND AMPHETAMINE SULFATE 2.5; 2.5; 2.5; 2.5 MG/1; MG/1; MG/1; MG/1
10 TABLET ORAL
Qty: 30 TABLET | Refills: 0 | Status: SHIPPED | OUTPATIENT
Start: 2025-01-27 | End: 2025-02-26

## 2025-01-27 RX ORDER — DEXTROAMPHETAMINE SACCHARATE, AMPHETAMINE ASPARTATE MONOHYDRATE, DEXTROAMPHETAMINE SULFATE AND AMPHETAMINE SULFATE 2.5; 2.5; 2.5; 2.5 MG/1; MG/1; MG/1; MG/1
10 CAPSULE, EXTENDED RELEASE ORAL EVERY MORNING
Qty: 30 CAPSULE | Refills: 0 | Status: SHIPPED | OUTPATIENT
Start: 2025-01-27 | End: 2025-02-26

## 2025-02-24 DIAGNOSIS — G47.411 NARCOLEPSY AND CATAPLEXY: ICD-10-CM

## 2025-02-24 RX ORDER — DEXTROAMPHETAMINE SACCHARATE, AMPHETAMINE ASPARTATE MONOHYDRATE, DEXTROAMPHETAMINE SULFATE AND AMPHETAMINE SULFATE 2.5; 2.5; 2.5; 2.5 MG/1; MG/1; MG/1; MG/1
10 CAPSULE, EXTENDED RELEASE ORAL EVERY MORNING
Qty: 30 CAPSULE | Refills: 0 | Status: SHIPPED | OUTPATIENT
Start: 2025-02-24 | End: 2025-03-26

## 2025-02-24 RX ORDER — DEXTROAMPHETAMINE SACCHARATE, AMPHETAMINE ASPARTATE, DEXTROAMPHETAMINE SULFATE AND AMPHETAMINE SULFATE 2.5; 2.5; 2.5; 2.5 MG/1; MG/1; MG/1; MG/1
10 TABLET ORAL
Qty: 30 TABLET | Refills: 0 | Status: SHIPPED | OUTPATIENT
Start: 2025-02-24 | End: 2025-03-26

## 2025-02-24 RX ORDER — FLUOXETINE 10 MG/1
10 CAPSULE ORAL DAILY
Qty: 90 CAPSULE | Refills: 3 | Status: SHIPPED | OUTPATIENT
Start: 2025-02-24

## 2025-03-26 DIAGNOSIS — G47.411 NARCOLEPSY AND CATAPLEXY: ICD-10-CM

## 2025-03-26 NOTE — TELEPHONE ENCOUNTER
Received a refill request for patients Xyrem.   [facility] pharmacy and medication attached.   Thank you!

## 2025-03-31 DIAGNOSIS — G47.411 NARCOLEPSY AND CATAPLEXY: ICD-10-CM

## 2025-03-31 RX ORDER — SODIUM OXYBATE 0.5 G/ML
3.75 SOLUTION ORAL 2 TIMES DAILY
Qty: 450 ML | Refills: 5 | Status: SHIPPED | OUTPATIENT
Start: 2025-03-31 | End: 2025-09-27

## 2025-04-02 RX ORDER — DEXTROAMPHETAMINE SACCHARATE, AMPHETAMINE ASPARTATE, DEXTROAMPHETAMINE SULFATE AND AMPHETAMINE SULFATE 2.5; 2.5; 2.5; 2.5 MG/1; MG/1; MG/1; MG/1
10 TABLET ORAL
Qty: 30 TABLET | Refills: 0 | Status: SHIPPED | OUTPATIENT
Start: 2025-04-02 | End: 2025-05-02

## 2025-04-02 RX ORDER — DEXTROAMPHETAMINE SACCHARATE, AMPHETAMINE ASPARTATE MONOHYDRATE, DEXTROAMPHETAMINE SULFATE AND AMPHETAMINE SULFATE 2.5; 2.5; 2.5; 2.5 MG/1; MG/1; MG/1; MG/1
10 CAPSULE, EXTENDED RELEASE ORAL EVERY MORNING
Qty: 30 CAPSULE | Refills: 0 | Status: SHIPPED | OUTPATIENT
Start: 2025-04-02 | End: 2025-05-02

## 2025-04-10 RX ORDER — PITOLISANT HYDROCHLORIDE 17.8 MG/1
TABLET, FILM COATED ORAL
Qty: 60 TABLET | Refills: 11 | Status: SHIPPED | OUTPATIENT
Start: 2025-04-10

## 2025-05-05 DIAGNOSIS — G47.411 NARCOLEPSY AND CATAPLEXY: ICD-10-CM

## 2025-05-05 RX ORDER — DEXTROAMPHETAMINE SACCHARATE, AMPHETAMINE ASPARTATE, DEXTROAMPHETAMINE SULFATE AND AMPHETAMINE SULFATE 2.5; 2.5; 2.5; 2.5 MG/1; MG/1; MG/1; MG/1
10 TABLET ORAL
Qty: 30 TABLET | Refills: 0 | Status: SHIPPED | OUTPATIENT
Start: 2025-05-05 | End: 2025-06-04

## 2025-05-05 RX ORDER — DEXTROAMPHETAMINE SACCHARATE, AMPHETAMINE ASPARTATE MONOHYDRATE, DEXTROAMPHETAMINE SULFATE AND AMPHETAMINE SULFATE 2.5; 2.5; 2.5; 2.5 MG/1; MG/1; MG/1; MG/1
10 CAPSULE, EXTENDED RELEASE ORAL EVERY MORNING
Qty: 30 CAPSULE | Refills: 0 | Status: SHIPPED | OUTPATIENT
Start: 2025-05-05 | End: 2025-06-04

## 2025-05-05 NOTE — TELEPHONE ENCOUNTER
Received refill request for adderall xr + adderall.   Medication was last ordered by ashley.   Medications were last ordered 04/2025 with 0 refills.     Patient was last seen in the office 01/2025.   Does patient have a scheduled follow up?: yes - 01/2026    Medication needs to be sent to [facility] in [place].     Thank you, please advise!    Patient's Allergies:  No Known Allergies

## 2025-06-04 DIAGNOSIS — G47.411 NARCOLEPSY AND CATAPLEXY: ICD-10-CM

## 2025-06-05 NOTE — TELEPHONE ENCOUNTER
Received refill request for amphetamine-dextroamphetamine (ADDERALL, 10MG,) 10 MG tablet.   Medication was last ordered by Karlene Puente.   Medication was last ordered on 5/5/25 with 0 refills.     Received refill request for amphetamine-dextroamphetamine (ADDERALL XR) 10 MG extended release capsule.   Medication was last ordered by Karlene Puente.   Medication was last ordered on 5/5/25 with 0 refills.     Patient was last seen in the office 1/3/25.   Does patient have a scheduled follow up?: yes - 1/16/26    Medication needs to be sent to [facility] #[id number] - [place], [place] - [address] [phone] - F [phone].     Thank you, please advise!    Patient's Allergies:  No Known Allergies

## 2025-06-09 RX ORDER — DEXTROAMPHETAMINE SACCHARATE, AMPHETAMINE ASPARTATE, DEXTROAMPHETAMINE SULFATE AND AMPHETAMINE SULFATE 2.5; 2.5; 2.5; 2.5 MG/1; MG/1; MG/1; MG/1
10 TABLET ORAL
Qty: 30 TABLET | Refills: 0 | Status: SHIPPED | OUTPATIENT
Start: 2025-06-09 | End: 2025-07-09

## 2025-06-09 RX ORDER — DEXTROAMPHETAMINE SACCHARATE, AMPHETAMINE ASPARTATE MONOHYDRATE, DEXTROAMPHETAMINE SULFATE AND AMPHETAMINE SULFATE 2.5; 2.5; 2.5; 2.5 MG/1; MG/1; MG/1; MG/1
10 CAPSULE, EXTENDED RELEASE ORAL EVERY MORNING
Qty: 30 CAPSULE | Refills: 0 | Status: SHIPPED | OUTPATIENT
Start: 2025-06-09 | End: 2025-07-09

## 2025-07-07 DIAGNOSIS — G47.411 NARCOLEPSY AND CATAPLEXY: ICD-10-CM

## 2025-07-09 RX ORDER — DEXTROAMPHETAMINE SACCHARATE, AMPHETAMINE ASPARTATE, DEXTROAMPHETAMINE SULFATE AND AMPHETAMINE SULFATE 2.5; 2.5; 2.5; 2.5 MG/1; MG/1; MG/1; MG/1
10 TABLET ORAL
Qty: 30 TABLET | Refills: 0 | Status: SHIPPED | OUTPATIENT
Start: 2025-07-09 | End: 2025-08-08

## 2025-07-09 RX ORDER — DEXTROAMPHETAMINE SACCHARATE, AMPHETAMINE ASPARTATE MONOHYDRATE, DEXTROAMPHETAMINE SULFATE AND AMPHETAMINE SULFATE 2.5; 2.5; 2.5; 2.5 MG/1; MG/1; MG/1; MG/1
10 CAPSULE, EXTENDED RELEASE ORAL EVERY MORNING
Qty: 30 CAPSULE | Refills: 0 | Status: SHIPPED | OUTPATIENT
Start: 2025-07-09 | End: 2025-08-08

## 2025-07-09 NOTE — TELEPHONE ENCOUNTER
Received refill request for amphetamine-dextroamphetamine (ADDERALL, 10MG,) 10 MG tablet.   Medication was last ordered by Karlene Puente.   Medication was last ordered on 6/9/25 with 0 refills.     Received refill request for amphetamine-dextroamphetamine (ADDERALL XR) 10 MG extended release capsule.   Medication was last ordered by Karlene Puente.   Medication was last ordered on 6/9/25 with 0 refills.     Patient was last seen in the office 1/3/25.   Does patient have a scheduled follow up?: yes - 1/16/26.    Medication needs to be sent to [facility]/pharmacy #[id number] - [place], [place] - [address] -  [phone] - F [phone]  [address] [place] [address]  Phone: [phone]  Fax: [phone].     Thank you, please advise!    Patient's Allergies:  No Known Allergies

## 2025-08-06 DIAGNOSIS — G47.411 NARCOLEPSY AND CATAPLEXY: ICD-10-CM

## 2025-08-07 RX ORDER — DEXTROAMPHETAMINE SACCHARATE, AMPHETAMINE ASPARTATE, DEXTROAMPHETAMINE SULFATE AND AMPHETAMINE SULFATE 2.5; 2.5; 2.5; 2.5 MG/1; MG/1; MG/1; MG/1
10 TABLET ORAL
Qty: 30 TABLET | Refills: 0 | Status: SHIPPED | OUTPATIENT
Start: 2025-08-07 | End: 2025-09-06

## 2025-08-07 RX ORDER — DEXTROAMPHETAMINE SACCHARATE, AMPHETAMINE ASPARTATE MONOHYDRATE, DEXTROAMPHETAMINE SULFATE AND AMPHETAMINE SULFATE 2.5; 2.5; 2.5; 2.5 MG/1; MG/1; MG/1; MG/1
10 CAPSULE, EXTENDED RELEASE ORAL EVERY MORNING
Qty: 30 CAPSULE | Refills: 0 | Status: SHIPPED | OUTPATIENT
Start: 2025-08-07 | End: 2025-09-06

## 2025-09-02 DIAGNOSIS — G47.411 NARCOLEPSY AND CATAPLEXY: ICD-10-CM

## 2025-09-02 RX ORDER — SODIUM OXYBATE 0.5 G/ML
3.75 SOLUTION ORAL 2 TIMES DAILY
Qty: 450 ML | Refills: 5 | Status: SHIPPED | OUTPATIENT
Start: 2025-09-02 | End: 2026-03-01